# Patient Record
Sex: MALE | Race: WHITE | NOT HISPANIC OR LATINO | Employment: OTHER | URBAN - METROPOLITAN AREA
[De-identification: names, ages, dates, MRNs, and addresses within clinical notes are randomized per-mention and may not be internally consistent; named-entity substitution may affect disease eponyms.]

---

## 2017-01-27 RX ORDER — AZITHROMYCIN 250 MG/1
250 TABLET, FILM COATED ORAL 3 TIMES WEEKLY
Qty: 36 TABLET | Refills: 3 | Status: SHIPPED | OUTPATIENT
Start: 2017-01-27 | End: 2017-04-28

## 2017-04-28 ENCOUNTER — OFFICE VISIT (OUTPATIENT)
Dept: PULMONOLOGY | Facility: CLINIC | Age: 72
End: 2017-04-28

## 2017-04-28 VITALS
SYSTOLIC BLOOD PRESSURE: 134 MMHG | HEIGHT: 70 IN | TEMPERATURE: 98.4 F | BODY MASS INDEX: 33.5 KG/M2 | OXYGEN SATURATION: 96 % | WEIGHT: 234 LBS | HEART RATE: 92 BPM | DIASTOLIC BLOOD PRESSURE: 80 MMHG | RESPIRATION RATE: 16 BRPM

## 2017-04-28 DIAGNOSIS — J45.40 MODERATE PERSISTENT ASTHMA WITHOUT COMPLICATION: ICD-10-CM

## 2017-04-28 DIAGNOSIS — J47.9 BRONCHIECTASIS WITHOUT COMPLICATION (HCC): Primary | ICD-10-CM

## 2017-04-28 PROCEDURE — 94060 EVALUATION OF WHEEZING: CPT | Performed by: INTERNAL MEDICINE

## 2017-04-28 PROCEDURE — 99214 OFFICE O/P EST MOD 30 MIN: CPT | Performed by: INTERNAL MEDICINE

## 2017-04-28 RX ORDER — AZITHROMYCIN 250 MG/1
250 TABLET, FILM COATED ORAL 3 TIMES WEEKLY
COMMUNITY
End: 2017-12-15 | Stop reason: SDUPTHER

## 2017-04-28 NOTE — PROGRESS NOTES
Subjective   Oswaldo Boggs is a 71 y.o.  male previously followed by Dr. Darleen Bates until I assumed his care January 2013. I last saw the patient in the office 10/14/16 and outlined his problems as follows:    1. COPD/asthma and bronchiectasis   A. Previous growth of sensitive pseudomonas (last grew 2/8/16)  B. On Zithromax 250 mg 3 times a week for suppression  2. Paroxysmal atrial fibrillation-in sinus when last seen  A. Refuses anticoagulation  3. 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 Obesity and poor conditioning  5. Hyperlipidemia  6. History nephrolithiasis passed spontaneously  7. Bilateral cataract extraction lens implant, umbilical hernia repair, left inguinal hernia repair  8. Glaucoma     He was doing well but is always has some coarse rhonchi which she did not clear well. I added ipratropium bromide to his albuterol nebulizer and asked him to continue to use a nebulizer 2-3 times a day in addition to his other therapy.    History of Present Illness   Since I last saw the patient he has had no major problems. No ER visits, hospitalizations, severe infections requiring antimicrobial coverage, etc. He is chronically short of breath but remains reasonably active. He gets up at 9 AM, goes to work and pays a few bills, buys a few cars which she sells and is lot etc. He is not however doing any form of regular exercise. He still has a chronic cough and produces some clear to white sputum when he first gets up in the morning and in the evenings. No hemoptysis, pleuritic pain, etc. He does have dyspnea on exertion but is able to perform all his usual activities.    He had a home sleep study about a year ago which indicated no need for CPAP but supposedly his saturations dropped into the 80s. He tells me he has a family members oxygen concentrator that they no longer needed and he uses it at 2 L at night (for some reason he tells me that insurance would not pay for his own concentrator).    The  following portions of the patient's history were reviewed and updated as appropriate: allergies, current medications, past family history, past medical history, past social history, past surgical history and problem list.    Review of Systems   Constitutional: Negative for appetite change, chills, diaphoresis, fatigue, fever and unexpected weight change.        Weight has remained stable   HENT: Negative for congestion, ear discharge, ear pain, hearing loss, postnasal drip, rhinorrhea, sinus pressure, sneezing, sore throat, trouble swallowing and voice change.    Eyes: Negative for visual disturbance.   Respiratory: Positive for cough (chronic as in the history of present illness). Negative for choking, chest tightness, shortness of breath, wheezing and stridor.    Cardiovascular: Positive for leg swelling ( minimal). Negative for chest pain and palpitations.   Gastrointestinal: Negative for abdominal pain, anal bleeding, blood in stool, constipation, diarrhea, nausea and vomiting.   Endocrine: Negative for cold intolerance, heat intolerance, polydipsia and polyuria.   Genitourinary: Negative for decreased urine volume, difficulty urinating, dysuria, frequency, hematuria and urgency.   Musculoskeletal: Negative for arthralgias, back pain, joint swelling and myalgias.   Skin: Negative for pallor and rash.   Allergic/Immunologic: Negative for environmental allergies, food allergies and immunocompromised state.   Neurological: Negative for dizziness, seizures, syncope, speech difficulty, weakness and headaches.   Hematological: Negative for adenopathy. Does not bruise/bleed easily.   Psychiatric/Behavioral: Negative for confusion, decreased concentration and sleep disturbance. The patient is not nervous/anxious.    All other systems reviewed and are negative.      Prior to Admission medications    Medication Sig Start Date End Date Taking? Authorizing Provider   albuterol (PROVENTIL) (2.5 MG/3ML) 0.083% nebulizer  "solution Daily. 11/25/13  Yes Historical Provider, MD   azithromycin (ZITHROMAX) 250 MG tablet Take 250 mg by mouth 3 (Three) Times a Week. Take 2 tablets the first day, then 1 tablet daily for 4 days.   Yes Historical Provider, MD   CARTIA  MG 24 hr capsule 1 tablet 2 (Two) Times a Day. 9/7/16  Yes Historical Provider, MD   colchicine 0.6 MG tablet As Needed. 8/15/16  Yes Historical Provider, MD   fluticasone-salmeterol (ADVAIR DISKUS) 500-50 MCG/DOSE DISKUS 1 puff 2 (Two) Times a Day. 3/31/16  Yes Historical Provider, MD   ibuprofen (ADVIL,MOTRIN) 600 MG tablet As Needed. 7/29/16  Yes Historical Provider, MD   latanoprost (XALATAN) 0.005 % ophthalmic solution Daily As Needed. 9/7/16  Yes Historical Provider, MD   azithromycin (ZITHROMAX) 250 MG tablet Take 1 tablet by mouth 3 (Three) Times a Week. Mon. Wed. Fri. 1/27/17 4/28/17  Vasu Latif MD       Objective   Blood pressure 134/80, pulse 92, temperature 98.4 °F (36.9 °C), resp. rate 16, height 70\" (177.8 cm), weight 234 lb (106 kg), SpO2 96 %.    Flowsheet Rows         First Filed Value    Admission Height  70\" (177.8 cm) Documented at 04/28/2017 1228    Admission Weight  234 lb (106 kg) Documented at 04/28/2017 1228        Physical Exam   Constitutional: He is oriented to person, place, and time. He appears well-developed and well-nourished.   Overweight white male in no acute distress   HENT:   Head: Normocephalic and atraumatic.   Right Ear: Hearing and external ear normal.   Left Ear: Hearing and external ear normal.   Nose: Nose normal.   Mouth/Throat: Oropharynx is clear and moist. No oropharyngeal exudate.   Eyes: Conjunctivae and EOM are normal. Pupils are equal, round, and reactive to light. Right eye exhibits no discharge. Left eye exhibits no discharge. No scleral icterus.   Neck: Normal range of motion. Neck supple. No JVD present. No tracheal deviation present. No thyromegaly present.   Cardiovascular: Normal rate, regular rhythm, normal " heart sounds and intact distal pulses.  Exam reveals no gallop and no friction rub.    No murmur heard.  Pulmonary/Chest: Effort normal. No accessory muscle usage or stridor. No apnea, no tachypnea and no bradypnea. No respiratory distress. He has no wheezes. He has no rhonchi. He has no rales. He exhibits no tenderness.   Continues to have coarse bilateral rhonchi   Abdominal: Soft. Bowel sounds are normal. He exhibits no distension and no mass. There is no splenomegaly or hepatomegaly. There is no tenderness. There is no rebound and no guarding. No hernia.   Obese but no hepatosplenomegaly   Musculoskeletal: Normal range of motion. He exhibits no edema, tenderness or deformity.   Lymphadenopathy:        Head (right side): No submandibular adenopathy present.        Head (left side): No submandibular adenopathy present.     He has no cervical adenopathy.        Right: No supraclavicular and no epitrochlear adenopathy present.        Left: No supraclavicular and no epitrochlear adenopathy present.   Neurological: He is alert and oriented to person, place, and time. He has normal reflexes. He displays normal reflexes. No cranial nerve deficit. He exhibits normal muscle tone. Coordination normal.   Skin: Skin is warm and dry. No bruising, no ecchymosis, no petechiae and no rash noted. He is not diaphoretic. No cyanosis or erythema. No pallor. Nails show no clubbing.   Very dry skin with flaking   Psychiatric: He has a normal mood and affect. His speech is normal and behavior is normal. Judgment and thought content normal.   Nursing note and vitals reviewed.    Spirometry: FVC 2.58 (58%), FEV1 1.47 (45%), FEV1/FVC ratio 57%. This indicates severe obstructive lung disease with an excellent response to bronchodilator (improves FVC and FEV1 22%). FEV1 has increased from 1.37, to 1.47 L since last study 2/8/16    Resting and exercise oxygen saturation: O2 sat is 100% at rest with a heart rate of 101. Patient performed a 6  minute walk and saturations remained at 99% although heart rate rapidly yi to 130    Assessment/Plan     1. COPD/asthma and bronchiectasis   A. Previous growth of sensitive pseudomonas (last grew 2/8/16)  B. On Zithromax 250 mg 3 times a week for suppression  2. Paroxysmal atrial fibrillation-obviously in sinus rhythm today  A. Previously has refused anticoagulation  3. 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 Obesity and poor conditioning  5. Hyperlipidemia  6. History nephrolithiasis passed spontaneously  7. Bilateral cataract extraction lens implant, umbilical hernia repair, left inguinal hernia repair  8. Glaucoma    Discussion:  1. Although he does not drop his saturations with exercise he does at night according to the records are received. I told him to continue nocturnal oxygen  2. Weight loss and exercise 5 days a week (20-30 minutes of brisk walk to maintain a heart rate of 115)  3. Continue Zithromax 3 times a week to suppress Pseudomonas  4. Increased nebulizers to 4 times a day when having difficulty clearing secretions  5. Use flutter valve after neb treatments. If ineffective we could consider VEST but I do not think it necessary at this point  6. Continue yearly flu vaccine  7. I will see you again in 6 months or sooner if necessary      Vasu Latif MD  Pulmonary and Critical Care Medicine  04/28/17 1:45 PM

## 2017-04-28 NOTE — PATIENT INSTRUCTIONS
Pulmonary function studies indicate stability of your obstructive lung disease (chronic asthma and bronchiectasis)    Recommendation:  1. Continue to use oxygen at night  2. I plan on walking you in the office today to make sure you do not need oxygen with exercise  3. Continue yearly flu vaccine  4. As far as I know Clare STOUT has given youboth Pneumovax and Prevnar vaccines  5. Continue Zithromax 3 times a week to suppress Pseudomonas  6. You should be exercising 5 days a week. 20-30 minutes brisk walk to maintain a heart rate of 115  7. If secretions become excessively thick, increase your nebulizer to 4 times a day  8. Use your flutter valve after each nebulizer treatment  9. I will see you again in 6 months or sooner if necessary  10. If your allergies worsen, the options are antihistamines, nasal steroids, or Singulair. These could be started by Gillian

## 2017-12-15 RX ORDER — AZITHROMYCIN 250 MG/1
250 TABLET, FILM COATED ORAL 3 TIMES WEEKLY
Qty: 36 TABLET | Refills: 0 | Status: SHIPPED | OUTPATIENT
Start: 2017-12-15 | End: 2018-01-18 | Stop reason: SDUPTHER

## 2018-01-18 ENCOUNTER — OFFICE VISIT (OUTPATIENT)
Dept: PULMONOLOGY | Facility: CLINIC | Age: 73
End: 2018-01-18

## 2018-01-18 VITALS
DIASTOLIC BLOOD PRESSURE: 72 MMHG | SYSTOLIC BLOOD PRESSURE: 130 MMHG | BODY MASS INDEX: 32.62 KG/M2 | HEIGHT: 71 IN | HEART RATE: 86 BPM | OXYGEN SATURATION: 95 % | TEMPERATURE: 98.2 F | WEIGHT: 233 LBS

## 2018-01-18 DIAGNOSIS — J45.50 SEVERE PERSISTENT ASTHMA WITHOUT COMPLICATION: ICD-10-CM

## 2018-01-18 DIAGNOSIS — J47.9 BRONCHIECTASIS WITHOUT COMPLICATION (HCC): Primary | ICD-10-CM

## 2018-01-18 DIAGNOSIS — R76.11 PPD POSITIVE: ICD-10-CM

## 2018-01-18 PROCEDURE — 99214 OFFICE O/P EST MOD 30 MIN: CPT | Performed by: INTERNAL MEDICINE

## 2018-01-18 RX ORDER — ALBUTEROL SULFATE 90 UG/1
AEROSOL, METERED RESPIRATORY (INHALATION)
COMMUNITY
Start: 2016-02-08 | End: 2018-01-18 | Stop reason: SDUPTHER

## 2018-01-18 RX ORDER — AZITHROMYCIN 250 MG/1
250 TABLET, FILM COATED ORAL 3 TIMES WEEKLY
Qty: 36 TABLET | Refills: 4 | Status: SHIPPED | OUTPATIENT
Start: 2018-01-19 | End: 2018-01-18 | Stop reason: SDUPTHER

## 2018-01-18 RX ORDER — ALBUTEROL SULFATE 90 UG/1
2 AEROSOL, METERED RESPIRATORY (INHALATION) EVERY 6 HOURS PRN
Qty: 1 INHALER | Refills: 5 | Status: SHIPPED | OUTPATIENT
Start: 2018-01-18

## 2018-01-18 RX ORDER — AZITHROMYCIN 250 MG/1
250 TABLET, FILM COATED ORAL 3 TIMES WEEKLY
Qty: 36 TABLET | Refills: 4 | Status: SHIPPED | OUTPATIENT
Start: 2018-01-19 | End: 2018-07-19 | Stop reason: SDUPTHER

## 2018-01-18 RX ORDER — IPRATROPIUM BROMIDE AND ALBUTEROL SULFATE 2.5; .5 MG/3ML; MG/3ML
3 SOLUTION RESPIRATORY (INHALATION) 4 TIMES DAILY PRN
Qty: 120 ML | Refills: 11 | COMMUNITY
Start: 2018-01-18 | End: 2018-07-19 | Stop reason: SDUPTHER

## 2018-01-18 RX ORDER — MONTELUKAST SODIUM 10 MG/1
TABLET ORAL
COMMUNITY
Start: 2017-12-19 | End: 2019-01-31 | Stop reason: SDUPTHER

## 2018-01-18 NOTE — PROGRESS NOTES
Oswaldo Boggs is a 72 y.o. male here for evaluation of   Chief Complaint   Patient presents with   • Recurrent Pneumonia       Patient Active Problem List   Diagnosis   • Bronchiectasis   • Dyslipidemia   • PPD positive   • Severe persistent asthma       Current Outpatient Prescriptions:   •  albuterol (PROAIR HFA) 108 (90 Base) MCG/ACT inhaler, Inhale 2 puffs Every 6 (Six) Hours As Needed for Wheezing., Disp: 1 inhaler, Rfl: 5  •  [START ON 1/19/2018] azithromycin (ZITHROMAX) 250 MG tablet, Take 1 tablet by mouth 3 (Three) Times a Week. Take 2 tablets the first day, then 1 tablet daily for 4 days., Disp: 36 tablet, Rfl: 4  •  CARTIA  MG 24 hr capsule, 1 tablet 2 (Two) Times a Day., Disp: , Rfl:   •  colchicine 0.6 MG tablet, As Needed., Disp: , Rfl:   •  fluticasone-salmeterol (ADVAIR DISKUS) 500-50 MCG/DOSE DISKUS, Inhale 1 puff 2 (Two) Times a Day., Disp: 3 each, Rfl: 4  •  ibuprofen (ADVIL,MOTRIN) 600 MG tablet, As Needed., Disp: , Rfl:   •  latanoprost (XALATAN) 0.005 % ophthalmic solution, Daily As Needed., Disp: , Rfl:   •  ipratropium-albuterol (DUO-NEB) 0.5-2.5 mg/mL nebulizer, Take 3 mL by nebulization 4 (Four) Times a Day As Needed for Wheezing., Disp: 120 mL, Rfl: 11  •  montelukast (SINGULAIR) 10 MG tablet, , Disp: , Rfl:       History of Present Illness  72-year-old gentleman followed by Dr. Latif for bronchiectasis, asthma. He has a known history of pseudomonas aeruginosa colonization. He coughs up approximately a quarter cup of sputum daily. Currently sputum is gray in color without hemoptysis. Approximately 2 months ago he took Levaquin and prednisone for a respiratory infection. He had 1 additional acute infection last year. He has not required emergency room visit or hospitalization. He wears oxygen 2 L at night. He is symptomatically short of air with exertion. He gets winded walking 250 feet. He has no history of coronary disease or chest pain. In 2015 he had an episode of proximal  atrial fibrillation. He denies palpitations or lower extremity edema. He does wheeze on a daily basis. He has a nebulizer but only uses it as needed. He is taking Advair twice daily. He does use an Acapella valve and takes a homeopathic medication to assist with mucous clearance. He denies fever, night sweats, weight loss.      Review of Systems   Constitutional: Positive for activity change. Negative for appetite change, fever and unexpected weight change.   HENT: Negative for congestion, sore throat and trouble swallowing.    Respiratory: Positive for cough, chest tightness, shortness of breath and wheezing.    Cardiovascular: Negative for chest pain, palpitations and leg swelling.   Gastrointestinal: Negative for abdominal distention, abdominal pain, blood in stool, constipation and diarrhea.   Endocrine: Negative.    Genitourinary: Negative for difficulty urinating, dysuria and frequency.   Musculoskeletal: Positive for arthralgias.        Left knee   Skin: Negative.    Neurological: Positive for light-headedness. Negative for dizziness, seizures, weakness, numbness and headaches.   Psychiatric/Behavioral: Negative.          Past Medical History:   Diagnosis Date   • Glaucoma    • History of positive PPD, untreated    • Hypertension    • Nephrolithiasis    • PAF (paroxysmal atrial fibrillation)    • Personal history of renal calculi     x1, passed spontaneously      Past Surgical History:   Procedure Laterality Date   • CATARACT EXTRACTION     • INGUINAL HERNIA REPAIR      x2   • UMBILICAL HERNIA REPAIR       Social History     Social History   • Marital status:      Spouse name: N/A   • Number of children: 3   • Years of education: N/A     Social History Main Topics   • Smoking status: Never Smoker   • Smokeless tobacco: Never Used   • Alcohol use Yes      Comment: 2 or 3 shots of liquor a day   • Drug use: No   • Sexual activity: Not Asked     Other Topics Concern   • None     Social History Narrative  "   Retired  heavy equipment, sold cars     Family History   Problem Relation Age of Onset   • COPD Mother    • Alcohol abuse Father    • Lung disease Sister      Blood pressure 130/72, pulse 86, temperature 98.2 °F (36.8 °C), height 180.3 cm (71\"), weight 106 kg (233 lb), SpO2 95 %.    Physical Exam   Constitutional: He is oriented to person, place, and time. He appears well-developed and well-nourished. No distress.   HENT:   Head: Normocephalic and atraumatic.   Nose: Nose normal.   Mouth/Throat: No oropharyngeal exudate.   Mild clear PND   Eyes: Pupils are equal, round, and reactive to light.   Neck: No JVD present. No tracheal deviation present. No thyromegaly present.   Cardiovascular: Normal rate and regular rhythm.    No murmur heard.  Pulmonary/Chest: Effort normal.   Bilateral mid and end expiratory rhonchi and wheeze R>L   Abdominal: Soft. Bowel sounds are normal. He exhibits no distension.   Musculoskeletal: He exhibits no edema.   Lymphadenopathy:     He has no cervical adenopathy.   Neurological: He is alert and oriented to person, place, and time.   Skin: Skin is warm and dry.   Psychiatric: He has a normal mood and affect.         PFTs: FVC 2.57 L, 58%, FEV1 1.37 L, 42% ratio 53%, total lung capacity 6.72 L, 103%, diffusion capacity 22.9, 91%, moderate to severe obstruction, no restriction or diffusion impairment, no significant change in FEV1 compared to 2017.    Radiology:    Lab:    Oswaldo was seen today for recurrent pneumonia.    Diagnoses and all orders for this visit:    Bronchiectasis without complication    Severe persistent asthma without complication    PPD positive    Other orders  -     Discontinue: fluticasone-salmeterol (ADVAIR DISKUS) 500-50 MCG/DOSE DISKUS; Inhale 1 puff 2 (Two) Times a Day.  -     Discontinue: azithromycin (ZITHROMAX) 250 MG tablet; Take 1 tablet by mouth 3 (Three) Times a Week. Take 2 tablets the first day, then 1 tablet daily for 4 days.  -     albuterol " (PROAIR HFA) 108 (90 Base) MCG/ACT inhaler; Inhale 2 puffs Every 6 (Six) Hours As Needed for Wheezing.  -     fluticasone-salmeterol (ADVAIR DISKUS) 500-50 MCG/DOSE DISKUS; Inhale 1 puff 2 (Two) Times a Day.  -     azithromycin (ZITHROMAX) 250 MG tablet; Take 1 tablet by mouth 3 (Three) Times a Week. Take 2 tablets the first day, then 1 tablet daily for 4 days.        Discussion:   72-year-old gentleman, nonsmoker, with severe asthma and bronchiectasis. He has known Pseudomonas colonization. He is taking Advair 500 one puff twice daily, azithromycin 3 times weekly. He has ipratropium bromide-albuterol nebulizer solution. However, he does not nebulize regularly. He coughs up sputum on a daily basis. He does not have hemoptysis. He received antibiotics and steroids twice last year for an exacerbation. Somewhat worrisome is his significant shortness of air on exertion. He can only walk 250 feet before having to stop and rest.    Look into pulmonary rehabilitation programs that might be closer to him  Refill Advair 500-50 one puff twice daily  Continue azithromycin 250 mg 3 times a week  Encourage him to use his nebulizer at least every morning but preferably twice daily  Flutter or Acapella valve  Follow-up in 6 months

## 2018-07-19 ENCOUNTER — OFFICE VISIT (OUTPATIENT)
Dept: PULMONOLOGY | Facility: CLINIC | Age: 73
End: 2018-07-19

## 2018-07-19 VITALS
BODY MASS INDEX: 34.58 KG/M2 | TEMPERATURE: 97.3 F | HEART RATE: 87 BPM | RESPIRATION RATE: 18 BRPM | HEIGHT: 71 IN | DIASTOLIC BLOOD PRESSURE: 82 MMHG | OXYGEN SATURATION: 96 % | SYSTOLIC BLOOD PRESSURE: 148 MMHG | WEIGHT: 247 LBS

## 2018-07-19 DIAGNOSIS — J47.9 BRONCHIECTASIS WITHOUT COMPLICATION (HCC): Primary | ICD-10-CM

## 2018-07-19 DIAGNOSIS — J45.50 SEVERE PERSISTENT ASTHMA, UNSPECIFIED WHETHER COMPLICATED: ICD-10-CM

## 2018-07-19 PROCEDURE — 99213 OFFICE O/P EST LOW 20 MIN: CPT | Performed by: INTERNAL MEDICINE

## 2018-07-19 PROCEDURE — 94375 RESPIRATORY FLOW VOLUME LOOP: CPT | Performed by: INTERNAL MEDICINE

## 2018-07-19 RX ORDER — AZITHROMYCIN 250 MG/1
250 TABLET, FILM COATED ORAL 3 TIMES WEEKLY
Qty: 36 TABLET | Refills: 4 | Status: SHIPPED | OUTPATIENT
Start: 2018-07-20 | End: 2018-07-23 | Stop reason: SDUPTHER

## 2018-07-19 RX ORDER — IPRATROPIUM BROMIDE AND ALBUTEROL SULFATE 2.5; .5 MG/3ML; MG/3ML
3 SOLUTION RESPIRATORY (INHALATION) 4 TIMES DAILY PRN
Qty: 270 ML | Refills: 4 | Status: SHIPPED | OUTPATIENT
Start: 2018-07-19 | End: 2019-01-31 | Stop reason: SDUPTHER

## 2018-07-19 NOTE — PROGRESS NOTES
Oswaldo Boggs is a 72 y.o. male here for evaluation of   Chief Complaint   Patient presents with   • Bronchiectasis without complication       Problem list:  1. Bronchiectasis  2. Chronic asthma  3. History of pseudomonas aeruginosa colonization  4. Positive PPD, untreated  5. Dyslipidemia  6. Nephrolithiasis  7. Glaucoma  8. Hypertension  9. Paroxysmal atrial fibrillation  10. Cataract extraction  11. Inguinal hernia repair ×2  12. Umbilical hernia repair  13. Nonsmoker, social alcohol  14. Allergy to amoxicillin, rash    History of Present Illness    72-year-old gentleman nonsmoker here for follow-up of chronic persistent asthma and bronchiectasis with Pseudomonas colonization. He typically coughs up a fourth of a cup of sputum daily. Sputum is grayish and right. Recently sputum has been less. He denies hemoptysis. He admits to some wheezing on a daily basis. It tends to be triggered when he is outdoors. He does not awaken at night with wheezing. Exacerbation for which she was given amoxicillin. This caused a rash to develop on his arms. He continues to be symptomatically short of air walking 250 feet. It has not worsened. Denies palpitations or chest pain. He does have some lower extremity edema.    Review of Systems   Constitutional: Negative.    Respiratory: Positive for cough, shortness of breath and wheezing.    Musculoskeletal: Positive for arthralgias and joint swelling.         Current Outpatient Prescriptions:   •  albuterol (PROAIR HFA) 108 (90 Base) MCG/ACT inhaler, Inhale 2 puffs Every 6 (Six) Hours As Needed for Wheezing., Disp: 1 inhaler, Rfl: 5  •  [START ON 7/20/2018] azithromycin (ZITHROMAX) 250 MG tablet, Take 1 tablet by mouth 3 (Three) Times a Week. Take 2 tablets the first day, then 1 tablet daily for 4 days., Disp: 36 tablet, Rfl: 4  •  CARTIA  MG 24 hr capsule, 1 tablet 2 (Two) Times a Day., Disp: , Rfl:   •  colchicine 0.6 MG tablet, As Needed., Disp: , Rfl:   •   "fluticasone-salmeterol (ADVAIR DISKUS) 500-50 MCG/DOSE DISKUS, Inhale 1 puff 2 (Two) Times a Day., Disp: 3 each, Rfl: 4  •  ibuprofen (ADVIL,MOTRIN) 600 MG tablet, As Needed., Disp: , Rfl:   •  ipratropium-albuterol (DUO-NEB) 0.5-2.5 mg/3 ml nebulizer, Take 3 mL by nebulization 4 (Four) Times a Day As Needed for Wheezing., Disp: 270 mL, Rfl: 4  •  latanoprost (XALATAN) 0.005 % ophthalmic solution, Daily As Needed., Disp: , Rfl:   •  montelukast (SINGULAIR) 10 MG tablet, , Disp: , Rfl:     Past Medical History:   Diagnosis Date   • Glaucoma    • History of positive PPD, untreated    • Hypertension    • Nephrolithiasis    • PAF (paroxysmal atrial fibrillation) (CMS/HCC)    • Personal history of renal calculi     x1, passed spontaneously      Past Surgical History:   Procedure Laterality Date   • CATARACT EXTRACTION     • INGUINAL HERNIA REPAIR      x2   • UMBILICAL HERNIA REPAIR       Social History     Social History   • Marital status:    • Number of children: 3     Social History Main Topics   • Smoking status: Never Smoker   • Smokeless tobacco: Never Used   • Alcohol use Yes      Comment: 2 or 3 shots of liquor a day   • Drug use: No   • Sexual activity: Defer     Other Topics Concern   • Not on file     Social History Narrative    Retired  heavy equipment, sold cars     Family History   Problem Relation Age of Onset   • COPD Mother    • Alcohol abuse Father    • Lung disease Sister      Blood pressure 148/82, pulse 87, temperature 97.3 °F (36.3 °C), resp. rate 18, height 180.3 cm (71\"), weight 112 kg (247 lb), SpO2 96 %.    Physical Exam   Constitutional: He is oriented to person, place, and time. He appears well-developed and well-nourished. No distress.   HENT:   Head: Normocephalic and atraumatic.   Nose: Nose normal.   Mouth/Throat: Oropharynx is clear and moist. No oropharyngeal exudate.   Eyes: Pupils are equal, round, and reactive to light. EOM are normal. No scleral icterus.   Neck: Normal " range of motion. Neck supple. No JVD present. No tracheal deviation present. No thyromegaly present.   Cardiovascular: Normal rate, regular rhythm and normal heart sounds.    No murmur heard.  Pulmonary/Chest: Effort normal.   DIFFUSE EXPIRATORY RHONCHI R.L   Abdominal: Soft. Bowel sounds are normal. He exhibits no distension. There is no tenderness.   Musculoskeletal: He exhibits no edema.   Lymphadenopathy:     He has no cervical adenopathy.   Neurological: He is alert and oriented to person, place, and time.   Skin: Skin is warm and dry.   Psychiatric: He has a normal mood and affect.         PFTs:  July 19, 2018 FVC 2.75 L, 63%, FEV1 1.40 L, 44% ratio 51% consistent with severe obstruction.    April 28, 2017 FVC 2.10 L, 48%, FEV1 1.19 L, 37% ratio 57%, after bronchodilator FVC improves to 2.58 L, 58%, FEV1 improves to 1.47 L, 45%    Radiology:  PA and lateral chest x-ray cardiac silhouette upper limits of normal. Minimal scarring in the right middle lobe and right lower lobe. Calcified granulomatous changes. Medial left base with streaking scarring. No acute infiltrate or effusion  Lab:    Oswaldo was seen today for bronchiectasis without complication.    Diagnoses and all orders for this visit:    Bronchiectasis without complication (CMS/[facility])  -     XR Chest PA & Lateral  -     Pulmonary Function Test    Severe persistent asthma, unspecified whether complicated    Other orders  -     fluticasone-salmeterol (ADVAIR DISKUS) 500-50 MCG/DOSE DISKUS; Inhale 1 puff 2 (Two) Times a Day.  -     azithromycin (ZITHROMAX) 250 MG tablet; Take 1 tablet by mouth 3 (Three) Times a Week. Take 2 tablets the first day, then 1 tablet daily for 4 days.  -     ipratropium-albuterol (DUO-NEB) 0.5-2.5 mg/3 ml nebulizer; Take 3 mL by nebulization 4 (Four) Times a Day As Needed for Wheezing.        Discussion:   72-year-old gentleman, nonsmoker with bronchiectasis, chronic persistent asthma. He is currently using an Advair disc 500-50  one puff twice daily and an albuterol nebulizer. He takes azithromycin 250 mg 3 times a week. He does get symptomatically short of air with exertion walking 250 feet. His exercise tolerance has not worsened. He has had one acute respiratory infection in the last 6 months. He does have significant congestion on examination but no active wheezing. Room air oxygen saturation is 96%. FEV1 is 1.40 L, 44% which is improved compared to April 2017 when his FEV1 was 3.20 L or 37%. Chest x-ray today has some chronic changes.    Refill Advair 500-50, 1 puff twice daily  Refill azithromycin 250 mg 3 times weekly  Albuterol nebulizer twice daily and up to 4 times daily as needed  Avoid anticholinergics secondary to glaucoma  Flu shot in October of this year  Follow-up in 6 months with spirometry    Ela Myrick MD

## 2018-07-23 RX ORDER — AZITHROMYCIN 250 MG/1
250 TABLET, FILM COATED ORAL 3 TIMES WEEKLY
Qty: 36 TABLET | Refills: 4 | Status: SHIPPED | OUTPATIENT
Start: 2018-07-23 | End: 2019-01-31 | Stop reason: SDUPTHER

## 2019-01-31 ENCOUNTER — OFFICE VISIT (OUTPATIENT)
Dept: PULMONOLOGY | Facility: CLINIC | Age: 74
End: 2019-01-31

## 2019-01-31 VITALS
OXYGEN SATURATION: 96 % | BODY MASS INDEX: 35.79 KG/M2 | WEIGHT: 250 LBS | TEMPERATURE: 97.5 F | HEIGHT: 70 IN | HEART RATE: 93 BPM | SYSTOLIC BLOOD PRESSURE: 126 MMHG | DIASTOLIC BLOOD PRESSURE: 70 MMHG | RESPIRATION RATE: 20 BRPM

## 2019-01-31 DIAGNOSIS — J45.909 ASTHMA, UNSPECIFIED ASTHMA SEVERITY, UNSPECIFIED WHETHER COMPLICATED, UNSPECIFIED WHETHER PERSISTENT: Primary | ICD-10-CM

## 2019-01-31 DIAGNOSIS — J47.9 BRONCHIECTASIS WITHOUT COMPLICATION (HCC): Primary | ICD-10-CM

## 2019-01-31 PROCEDURE — 99213 OFFICE O/P EST LOW 20 MIN: CPT | Performed by: INTERNAL MEDICINE

## 2019-01-31 PROCEDURE — 94375 RESPIRATORY FLOW VOLUME LOOP: CPT | Performed by: INTERNAL MEDICINE

## 2019-01-31 RX ORDER — MONTELUKAST SODIUM 10 MG/1
10 TABLET ORAL NIGHTLY
Qty: 90 TABLET | Refills: 4 | Status: SHIPPED | OUTPATIENT
Start: 2019-01-31

## 2019-01-31 RX ORDER — AZITHROMYCIN 250 MG/1
250 TABLET, FILM COATED ORAL 3 TIMES WEEKLY
Qty: 36 TABLET | Refills: 4 | Status: SHIPPED | OUTPATIENT
Start: 2019-02-01

## 2019-01-31 RX ORDER — IPRATROPIUM BROMIDE AND ALBUTEROL SULFATE 2.5; .5 MG/3ML; MG/3ML
3 SOLUTION RESPIRATORY (INHALATION) 4 TIMES DAILY PRN
Qty: 270 ML | Refills: 4 | Status: SHIPPED | OUTPATIENT
Start: 2019-01-31

## 2019-01-31 NOTE — PROGRESS NOTES
Oswaldo Boggs is a 73 y.o. male here for evaluation of   Chief Complaint   Patient presents with   • Recurrent Pneumonia       Problem list:  1. Bronchiectasis  2. Chronic asthma  3. History of pseudomonas aeruginosa colonization  4. Positive PPD, untreated  5. Dyslipidemia  6. Nephrolithiasis  7. Glaucoma  8. Hypertension  9. Paroxysmal atrial fibrillation  10. Cataract extraction  11. Inguinal hernia repair ×2  12. Umbilical hernia repair  13. Nonsmoker, social alcohol  14. Allergy to amoxicillin, rash    History of Present Illness    72-year-old gentleman, nonsmoker with a history of bronchiectasis, Pseudomonas colonization. He coughs up chronic sputum on a daily basis. It is grayish in color. He denies hemoptysis. On average he will cough up a quarter cup a day. Presently 4-5 weeks ago he did take Levaquin for 10 days secondary to increasing sputum production. Currently he denies wheezing. He does have exertional dyspnea. He feels it is at baseline. Is not required hospitalization for respiratory infection. He did hurt his left shoulder and is currently undergoing physical therapy. He does become symptomatically short of air walking 250 feet. He wears supplemental oxygen at night.    Review of Systems   Constitutional: Negative for fever.   HENT: Positive for congestion and rhinorrhea.    Eyes:        Glaucoma; recent exam pressures ok 14   Respiratory: Positive for cough, chest tightness, shortness of breath and wheezing.    Cardiovascular: Negative for chest pain and leg swelling.   Musculoskeletal:        Left Shoulder pain, getting PT   All other systems reviewed and are negative.        Current Outpatient Medications:   •  albuterol (PROAIR HFA) 108 (90 Base) MCG/ACT inhaler, Inhale 2 puffs Every 6 (Six) Hours As Needed for Wheezing., Disp: 1 inhaler, Rfl: 5  •  azithromycin (ZITHROMAX) 250 MG tablet, Take 1 tablet by mouth 3 (Three) Times a Week. Mon, Wed, Fri, Disp: 36 tablet, Rfl: 4  •  colchicine 0.6  "MG tablet, As Needed., Disp: , Rfl:   •  fluticasone-salmeterol (ADVAIR DISKUS) 500-50 MCG/DOSE DISKUS, Inhale 1 puff 2 (Two) Times a Day., Disp: 3 each, Rfl: 4  •  ibuprofen (ADVIL,MOTRIN) 600 MG tablet, As Needed., Disp: , Rfl:   •  ipratropium-albuterol (DUO-NEB) 0.5-2.5 mg/3 ml nebulizer, Take 3 mL by nebulization 4 (Four) Times a Day As Needed for Wheezing., Disp: 270 mL, Rfl: 4  •  latanoprost (XALATAN) 0.005 % ophthalmic solution, Daily As Needed., Disp: , Rfl:   •  montelukast (SINGULAIR) 10 MG tablet, , Disp: , Rfl:     Past Medical History:   Diagnosis Date   • Glaucoma    • History of positive PPD, untreated    • Hypertension    • Nephrolithiasis    • PAF (paroxysmal atrial fibrillation) (CMS/HCC)    • Personal history of renal calculi     x1, passed spontaneously      Past Surgical History:   Procedure Laterality Date   • CATARACT EXTRACTION     • INGUINAL HERNIA REPAIR      x2   • UMBILICAL HERNIA REPAIR       Social History     Socioeconomic History   • Marital status:      Spouse name: Not on file   • Number of children: 3   • Years of education: Not on file   • Highest education level: Not on file   Tobacco Use   • Smoking status: Never Smoker   • Smokeless tobacco: Never Used   Substance and Sexual Activity   • Alcohol use: Yes     Comment: 2 or 3 shots of liquor a day   • Drug use: No   • Sexual activity: Defer   Social History Narrative    Retired  heavy equipment, sold cars     Family History   Problem Relation Age of Onset   • COPD Mother    • Alcohol abuse Father    • Lung disease Sister      Blood pressure 126/70, pulse 93, temperature 97.5 °F (36.4 °C), temperature source Temporal, resp. rate 20, height 177.8 cm (70\"), weight 113 kg (250 lb), SpO2 96 %.    Physical Exam   Constitutional: He is oriented to person, place, and time. He appears well-developed and well-nourished. No distress.   HENT:   Head: Normocephalic and atraumatic.   Nasal erythema, no polyps   Eyes: Pupils " are equal, round, and reactive to light. No scleral icterus.   Neck: No JVD present. No tracheal deviation present. No thyromegaly present.   Cardiovascular: Normal rate, regular rhythm and normal heart sounds.   No murmur heard.  Pulmonary/Chest: Effort normal and breath sounds normal. No respiratory distress.   Rare expiratory rhonchi   Abdominal: Soft. Bowel sounds are normal. There is no tenderness.   Musculoskeletal: He exhibits no edema.   Lymphadenopathy:     He has no cervical adenopathy.   Neurological: He is alert and oriented to person, place, and time.   Skin: Skin is warm and dry.   Psychiatric: He has a normal mood and affect.         PFTs:  FVC 2.81 L, 65%, FEV1 1.50 L, 48% ratio 54%, moderate to severe obstruction, possible restriction    Radiology:    Lab:    Oswaldo was seen today for recurrent pneumonia.    Diagnoses and all orders for this visit:    Bronchiectasis without complication (CMS/[facility])        Discussion:   73-year-old gentleman, nonsmoker with bronchiectasis, chronic sputum production. He did require one round of antibiotics recently for increasing sputum production. Today he has no active wheezing on examination. He has moderate to severe obstruction on pulmonary function tests with an FEV1 of 1.50 L or 48%. He still has symptomatic dyspnea on exertion. He can only walk around 250 feet before he is winded.    Referred to [facility] for pulmonary rehabilitation  Continue duo neb twice daily and as needed up to 4 times daily  Refill Advair 500-50 one puff twice daily, Singulair 10 mg daily, azithromycin 250 mg 3 times a week  Mucolytic's  Follow-up in 6 months with chest x-ray    Ela Myrick MD

## 2019-02-01 DIAGNOSIS — J47.9 BRONCHIECTASIS WITHOUT COMPLICATION (HCC): Primary | ICD-10-CM

## 2019-02-07 ENCOUNTER — TRANSCRIBE ORDERS (OUTPATIENT)
Dept: PULMONOLOGY | Facility: CLINIC | Age: 74
End: 2019-02-07

## 2019-11-10 ENCOUNTER — HOSPITAL ENCOUNTER (INPATIENT)
Facility: HOSPITAL | Age: 74
LOS: 2 days | Discharge: HOME | End: 2019-11-12
Attending: INTERNAL MEDICINE | Admitting: INTERNAL MEDICINE

## 2019-11-10 ENCOUNTER — APPOINTMENT (OUTPATIENT)
Dept: MRI IMAGING | Facility: HOSPITAL | Age: 74
End: 2019-11-10

## 2019-11-10 ENCOUNTER — APPOINTMENT (OUTPATIENT)
Dept: CT IMAGING | Facility: HOSPITAL | Age: 74
End: 2019-11-10

## 2019-11-10 DIAGNOSIS — R41.841 COGNITIVE COMMUNICATION DEFICIT: ICD-10-CM

## 2019-11-10 DIAGNOSIS — I63.40 CEREBROVASCULAR ACCIDENT (CVA) DUE TO EMBOLISM OF CEREBRAL ARTERY (HCC): Primary | ICD-10-CM

## 2019-11-10 PROBLEM — F10.10 ALCOHOL ABUSE: Status: ACTIVE | Noted: 2019-11-10

## 2019-11-10 PROBLEM — I63.9 CVA (CEREBRAL VASCULAR ACCIDENT): Status: ACTIVE | Noted: 2019-11-10

## 2019-11-10 PROBLEM — I48.0 PAF (PAROXYSMAL ATRIAL FIBRILLATION): Status: ACTIVE | Noted: 2019-11-10

## 2019-11-10 PROBLEM — I10 HYPERTENSION: Status: ACTIVE | Noted: 2019-11-10

## 2019-11-10 LAB — GLUCOSE BLDC GLUCOMTR-MCNC: 132 MG/DL (ref 70–130)

## 2019-11-10 PROCEDURE — 0 IOPAMIDOL PER 1 ML: Performed by: INTERNAL MEDICINE

## 2019-11-10 PROCEDURE — 70551 MRI BRAIN STEM W/O DYE: CPT

## 2019-11-10 PROCEDURE — 70498 CT ANGIOGRAPHY NECK: CPT

## 2019-11-10 PROCEDURE — 94799 UNLISTED PULMONARY SVC/PX: CPT

## 2019-11-10 PROCEDURE — 99222 1ST HOSP IP/OBS MODERATE 55: CPT | Performed by: INTERNAL MEDICINE

## 2019-11-10 PROCEDURE — 70496 CT ANGIOGRAPHY HEAD: CPT

## 2019-11-10 PROCEDURE — 82962 GLUCOSE BLOOD TEST: CPT

## 2019-11-10 PROCEDURE — 25010000002 THIAMINE PER 100 MG: Performed by: NURSE PRACTITIONER

## 2019-11-10 PROCEDURE — 0042T HC CT CEREBRAL PERFUSION W/WO CONTRAST: CPT

## 2019-11-10 RX ORDER — LORAZEPAM 1 MG/1
2 TABLET ORAL
Status: DISCONTINUED | OUTPATIENT
Start: 2019-11-10 | End: 2019-11-11

## 2019-11-10 RX ORDER — LORAZEPAM 2 MG/ML
1 INJECTION INTRAMUSCULAR
Status: DISCONTINUED | OUTPATIENT
Start: 2019-11-10 | End: 2019-11-11

## 2019-11-10 RX ORDER — FOLIC ACID 1 MG/1
1 TABLET ORAL DAILY
Status: DISCONTINUED | OUTPATIENT
Start: 2019-11-11 | End: 2019-11-11

## 2019-11-10 RX ORDER — COLCHICINE 0.6 MG/1
0.6 TABLET ORAL EVERY 12 HOURS PRN
Status: DISCONTINUED | OUTPATIENT
Start: 2019-11-10 | End: 2019-11-11

## 2019-11-10 RX ORDER — AZITHROMYCIN 250 MG/1
250 TABLET, FILM COATED ORAL 3 TIMES WEEKLY
Status: DISCONTINUED | OUTPATIENT
Start: 2019-11-11 | End: 2019-11-12 | Stop reason: HOSPADM

## 2019-11-10 RX ORDER — LORAZEPAM 0.5 MG/1
0.5 TABLET ORAL
Status: DISCONTINUED | OUTPATIENT
Start: 2019-11-10 | End: 2019-11-11

## 2019-11-10 RX ORDER — LATANOPROST 50 UG/ML
1 SOLUTION/ DROPS OPHTHALMIC NIGHTLY
Status: DISCONTINUED | OUTPATIENT
Start: 2019-11-10 | End: 2019-11-12 | Stop reason: HOSPADM

## 2019-11-10 RX ORDER — LORAZEPAM 1 MG/1
1 TABLET ORAL
Status: DISCONTINUED | OUTPATIENT
Start: 2019-11-10 | End: 2019-11-11

## 2019-11-10 RX ORDER — LORAZEPAM 2 MG/ML
2 INJECTION INTRAMUSCULAR
Status: DISCONTINUED | OUTPATIENT
Start: 2019-11-10 | End: 2019-11-11

## 2019-11-10 RX ORDER — ASPIRIN 300 MG/1
300 SUPPOSITORY RECTAL DAILY
Status: DISCONTINUED | OUTPATIENT
Start: 2019-11-11 | End: 2019-11-12

## 2019-11-10 RX ORDER — THIAMINE MONONITRATE (VIT B1) 100 MG
100 TABLET ORAL DAILY
Status: DISCONTINUED | OUTPATIENT
Start: 2019-11-11 | End: 2019-11-11

## 2019-11-10 RX ORDER — BUDESONIDE AND FORMOTEROL FUMARATE DIHYDRATE 160; 4.5 UG/1; UG/1
2 AEROSOL RESPIRATORY (INHALATION)
Status: DISCONTINUED | OUTPATIENT
Start: 2019-11-10 | End: 2019-11-12 | Stop reason: HOSPADM

## 2019-11-10 RX ORDER — ACETAMINOPHEN 325 MG/1
650 TABLET ORAL EVERY 6 HOURS PRN
Status: DISCONTINUED | OUTPATIENT
Start: 2019-11-10 | End: 2019-11-12

## 2019-11-10 RX ORDER — SODIUM CHLORIDE 9 MG/ML
75 INJECTION, SOLUTION INTRAVENOUS CONTINUOUS
Status: DISCONTINUED | OUTPATIENT
Start: 2019-11-10 | End: 2019-11-11

## 2019-11-10 RX ORDER — SODIUM CHLORIDE 0.9 % (FLUSH) 0.9 %
10 SYRINGE (ML) INJECTION AS NEEDED
Status: DISCONTINUED | OUTPATIENT
Start: 2019-11-10 | End: 2019-11-12 | Stop reason: HOSPADM

## 2019-11-10 RX ORDER — LORAZEPAM 2 MG/ML
0.5 INJECTION INTRAMUSCULAR
Status: DISCONTINUED | OUTPATIENT
Start: 2019-11-10 | End: 2019-11-11

## 2019-11-10 RX ORDER — FAMOTIDINE 10 MG/ML
20 INJECTION, SOLUTION INTRAVENOUS EVERY 12 HOURS SCHEDULED
Status: DISCONTINUED | OUTPATIENT
Start: 2019-11-10 | End: 2019-11-11

## 2019-11-10 RX ORDER — THIAMINE MONONITRATE (VIT B1) 100 MG
100 TABLET ORAL ONCE
Status: COMPLETED | OUTPATIENT
Start: 2019-11-10 | End: 2019-11-10

## 2019-11-10 RX ORDER — SODIUM CHLORIDE 0.9 % (FLUSH) 0.9 %
10 SYRINGE (ML) INJECTION EVERY 12 HOURS SCHEDULED
Status: DISCONTINUED | OUTPATIENT
Start: 2019-11-10 | End: 2019-11-12 | Stop reason: HOSPADM

## 2019-11-10 RX ORDER — ASPIRIN 325 MG
325 TABLET ORAL DAILY
Status: DISCONTINUED | OUTPATIENT
Start: 2019-11-11 | End: 2019-11-12 | Stop reason: HOSPADM

## 2019-11-10 RX ORDER — DIPHENOXYLATE HYDROCHLORIDE AND ATROPINE SULFATE 2.5; .025 MG/1; MG/1
1 TABLET ORAL DAILY
Status: DISCONTINUED | OUTPATIENT
Start: 2019-11-11 | End: 2019-11-11

## 2019-11-10 RX ORDER — ATORVASTATIN CALCIUM 40 MG/1
80 TABLET, FILM COATED ORAL NIGHTLY
Status: DISCONTINUED | OUTPATIENT
Start: 2019-11-10 | End: 2019-11-12 | Stop reason: HOSPADM

## 2019-11-10 RX ORDER — IPRATROPIUM BROMIDE AND ALBUTEROL SULFATE 2.5; .5 MG/3ML; MG/3ML
3 SOLUTION RESPIRATORY (INHALATION)
Status: DISCONTINUED | OUTPATIENT
Start: 2019-11-10 | End: 2019-11-12 | Stop reason: HOSPADM

## 2019-11-10 RX ADMIN — ACETAMINOPHEN 650 MG: 325 TABLET ORAL at 22:39

## 2019-11-10 RX ADMIN — LATANOPROST 1 DROP: 50 SOLUTION OPHTHALMIC at 20:19

## 2019-11-10 RX ADMIN — BUDESONIDE AND FORMOTEROL FUMARATE DIHYDRATE 2 PUFF: 160; 4.5 AEROSOL RESPIRATORY (INHALATION) at 20:53

## 2019-11-10 RX ADMIN — IPRATROPIUM BROMIDE AND ALBUTEROL SULFATE 3 ML: 2.5; .5 SOLUTION RESPIRATORY (INHALATION) at 20:53

## 2019-11-10 RX ADMIN — SODIUM CHLORIDE 75 ML/HR: 9 INJECTION, SOLUTION INTRAVENOUS at 18:52

## 2019-11-10 RX ADMIN — THIAMINE HYDROCHLORIDE 100 MG: 100 INJECTION, SOLUTION INTRAMUSCULAR; INTRAVENOUS at 20:56

## 2019-11-10 RX ADMIN — SODIUM CHLORIDE, PRESERVATIVE FREE 10 ML: 5 INJECTION INTRAVENOUS at 20:12

## 2019-11-10 RX ADMIN — ATORVASTATIN CALCIUM 80 MG: 40 TABLET, FILM COATED ORAL at 20:23

## 2019-11-10 RX ADMIN — FAMOTIDINE 20 MG: 10 INJECTION, SOLUTION INTRAVENOUS at 20:12

## 2019-11-10 RX ADMIN — IOPAMIDOL 100 ML: 755 INJECTION, SOLUTION INTRAVENOUS at 19:26

## 2019-11-10 NOTE — PLAN OF CARE
Problem: Patient Care Overview  Goal: Plan of Care Review  Outcome: Ongoing (interventions implemented as appropriate)   11/10/19 6416   Coping/Psychosocial   Plan of Care Reviewed With patient   Plan of Care Review   Progress no change       Problem: Stroke (Ischemic) (Adult)  Goal: Signs and Symptoms of Listed Potential Problems Will be Absent, Minimized or Managed (Stroke)  Outcome: Ongoing (interventions implemented as appropriate)   11/10/19 1136   Goal/Outcome Evaluation   Problems Assessed (Stroke (Ischemic)) all   Problems Assessed (Stroke (Ischemic)) other (see comments)  (DYSARTHRIA)

## 2019-11-10 NOTE — NURSING NOTE
ACC REVIEW REPORT: [facility]        PATIENT NAME: Oswaldo Boggs    PATIENT ID: [id number]    BED:  N235    BED TYPE:  ICU    BED GIVEN TO:  MAY AMIN RN    TIME BED GIVEN:  17:30    YOB: 1945    AGE:      GENDER:  MALE    PREVIOUS ADMIT TO [facility]:  NO    PREVIOUS ADMISSION DATE:  NONE    PATIENT CLASS:  INPATIENT    TODAY'S DATE: 11/10/2019    TRANSFER DATE:   11/10/19    ETA:  18:02    TRANSFERRING FACILITY:   [facility] ED    TRANSFERRING FACILITY PHONE # :  [phone] OPTION 4    TRANSFERRING MD:  DR ANGELICA RANGEL    DATE/TIME REQUEST RECEIVED:  17:33 11/10/19    [facility] RN:  CARO RAMIREZ RN     REPORT FROM:  17:30    TIME REPORT TAKEN:  17:30    DIAGNOSIS:  CVA    REASON FOR TRANSFER TO [facility]:  CVA    TRANSPORTATION:  AIR METHODS    CLINICAL REASON FOR TRANSFER TO [facility]:  CVA, LAST KNOWN NORMAL AT 15:00 TODAY. PT WENT TO BATHROOM WAS DIZZY WITH SLURRED SPEECH.      CLINICAL INFORMATION    HEIGHT:      WEIGHT:  ESTIMATED  KG    ALLERGIES:   NKDA    FRANKLIN:      INFECTIOUS DISEASE:      ISOLATION:      1ST VITAL SIGNS:   TIME:  16:57  TEMP:  97.8  PULSE:  75  B/P:  176/87  RESP:  20    LAST VITAL SIGNS:  TIME:  17:43  TEMP:     PULSE:    B/P:  190/92  RESP:      LAB INFORMATION:      CULTURE INFORMATION:      MEDS/IV FLUIDS:  IV ACCESS # 20 GA IN THE RIGHT AND LEFT A/C       TPA GIVEN BOLUS OF 9  MG PER PROTOCOL FOLLOWED BY 90 MG PER PROTOCOL WITH 100 ML FLUSH BEHIND TPA      CARDIAC SYSTEM:    CHEST PAIN:      RATE:      SCALE:      RHYTHM:   SINUS TACHYCARDIA    Is patient taking or has patient been given any drugs that could increase bleeding?    (Plavix, Brilinta, Effient, Eliquis, Xarelto, Warfarin, Integrilin, Angiomax)    DRUG:       DOSE/FREQUENCY:      CARDIAC NOTES:  HX HTN    RESPIRATORY SYSTEM:    LUNG SOUNDS:    CLEAR:  YES      OXYGEN:  NONE    O2 SAT:  98%    ADMINISTRATION ROUTE:  ROOM AIR    RADIOLOGY RESULTS:      RESPIRATORY STATUS:         CNS/MUSCULOSKELETAL      JUANITO COMA SCALE:    E:  4  M:  6  V:  5    LAST KNOWN WELL:   15:00 TODAY    NIHSS  1    Survey Item  0: Means Alert  1: Drowsy or Answer Correctly  2: Incorrect, Forced, Can't Resist Gravity  3: Complete or No Effort  4: No Movement  NT: Not Testable Acceptable As Noted Above    1A: Level of Consciousness (alert, drowsy, etc) :      1B: LOC Questions (month, age) :      1C: LOC Commands (open/close eyes, make a fist & let go):      2:  Best Gaze (eyes open-pt follows examiner's fingers or face):      3:  Visual (introduce visual stimulus/threat to pt's visual field quad. Cover 1 eye and hold up fingers in all 4 quadrants) :      4.  Facial Palsy (show teeth, raise eyebrows and squeeze eyes tightly shut):      5A: Motor Arm-Left (elevate extremity to 90 degrees and score drift/movement.  Count to 10 aloud and use fingers for visual cue):      5B:  Motor Arm-Right (elevate extremity to 90 degrees and score drift/movement.  Count to 10 aloud and use fingers for visual cue):      6A:  Motor Leg-Left (elevate extremity to 30 degrees and score drift/movement.  Count to 5 out loud and use fingers for visual cue):      6B:  Motor Leg-Right (elevate extremity to 30 degrees and score drift/movement.  Count to 5 out loud and use fingers for visual cue):       7:  Limb Ataxia- finger to nose, heel down shin:     8:  Sensory- pin prick to face, arms, trunk, and legs. Compare sharpness side to side:       9:  Best Language- name, items, describe picture, and read sentences.  Do not forget glasses if they normally wear them:      10: Dysarthria- elevate speech clarity by pt reading or repeating words on a list:  1    11: Extinction and Inattention- Use information from prior testing or double simultaneous stimuli testing to identify neglect. Face, arms, legs and visual field:      Total NIHSS Score:  1  Date:  11/10/20-19  Time of NIHSS Assessment:      SIZE OF HEMORRHAGE:  NONE    CAT SCAN  RESULTS:  NO ICH        CNS/MUSCULOSKELETAL NOTES:  PT HAD HIS LAST DRINK OF ALCOHOL PRODUCTS 3 DAYS AGO.  HE STATED TO THE TRANSFERRING NURSE HE HAD 2 DRINKS / DAY.      GI//GY    GI//GY NOTES:  PT IS NPO.    PAST MEDICAL HISTORY:  HTN and GOUT    OTHER SYMPTOM NOTES:      ADDITIONAL NOTES:            Luda Hong RN  11/10/2019  5:47 PM

## 2019-11-10 NOTE — H&P
"  CRITICAL CARE ADMISSION NOTE    Chief Complaint     CVA (cerebral vascular accident) (CMS/[facility])      Problem List       CVA (cerebral vascular accident) (CMS/HCC) post tPA    Dyslipidemia    PPD positive    PAF (paroxysmal atrial fibrillation) (CMS/[facility])    Hypertension    Alcohol abuse        History of Present Illness     Oswaldo Boggs is a 74 y.o. male, non-smoker, known to our pulmonary service and presents to [facility] via air ambulance after presenting to [facility] with speech difficulty.  Last known normal was 1500 this afternoon after which time the patient was using the restroom when he became dizzy and had slurred speech, and stated \"I feel like I am drunk but I have not drank any alcohol\".  He discusses that while in the restroom he had a \"clogged ear\" and put hydrogen peroxide in after which he became dizzy and had to sit down.  He denies a fall.  Patient does admit to drinking \"a lot\" and when pressed states that it is likely more than 1/5/day which he has tried to decrease and quit drinking altogether about 4 days ago.  Prior to this episode he denies a recent history of fever or chills, cough/congestion, nausea/vomiting/constipation/diarrhea/abdominal pain, lower extremity edema, palpitations, lymphadenopathy, or headaches.  He denies a history of \"blacking out\" or seizures with his alcohol use. He denies use of tobacco products but has a history of bronchiectasis and positive PPD.  He uses 2 L of oxygen at night.  His initial NIH score was 2, upon arrival to [facility] his NIH score is 1.      Past Medical History, Surgical History, Family, Social History, and ROS       He  has a past medical history of Glaucoma, History of positive PPD, untreated, Hypertension, Nephrolithiasis, PAF (paroxysmal atrial fibrillation) (CMS/[facility]), and Personal history of renal calculi.    Past Surgical History:   Procedure Laterality Date   • CATARACT EXTRACTION     • " COLONOSCOPY     • INGUINAL HERNIA REPAIR      x2   • UMBILICAL HERNIA REPAIR         Allergies   Allergen Reactions   • Amoxil [Amoxicillin] Rash     No current facility-administered medications on file prior to encounter.      Current Outpatient Medications on File Prior to Encounter   Medication Sig   • albuterol (PROAIR HFA) 108 (90 Base) MCG/ACT inhaler Inhale 2 puffs Every 6 (Six) Hours As Needed for Wheezing.   • azithromycin (ZITHROMAX) 250 MG tablet Take 1 tablet by mouth 3 (Three) Times a Week. Mon, Wed, Fri   • colchicine 0.6 MG tablet As Needed.   • fluticasone-salmeterol (ADVAIR DISKUS) 500-50 MCG/DOSE DISKUS Inhale 1 puff 2 (Two) Times a Day.   • ibuprofen (ADVIL,MOTRIN) 600 MG tablet As Needed.   • ipratropium-albuterol (DUO-NEB) 0.5-2.5 mg/3 ml nebulizer Take 3 mL by nebulization 4 (Four) Times a Day As Needed for Wheezing.   • latanoprost (XALATAN) 0.005 % ophthalmic solution Daily As Needed.   • montelukast (SINGULAIR) 10 MG tablet Take 1 tablet by mouth Every Night.     MEDICATION LIST AND ALLERGIES REVIEWED.    Family History   Problem Relation Age of Onset   • COPD Mother    • Alcohol abuse Father    • Lung disease Sister      Social History     Tobacco Use   • Smoking status: Never Smoker   • Smokeless tobacco: Never Used   Substance Use Topics   • Alcohol use: Yes     Comment: 2 or 3 shots of liquor a day   • Drug use: No     Social History     Social History Narrative    Retired  heavy equipment, sold cars     FAMILY AND SOCIAL HISTORY REVIEWED.    Review of Systems   Constitutional: Negative for chills and fever.   HENT: Positive for hearing loss. Negative for congestion and sneezing.    Eyes: Negative.    Respiratory: Positive for cough. Negative for choking, shortness of breath and wheezing.    Cardiovascular: Negative for chest pain and palpitations.   Gastrointestinal: Negative for abdominal pain, blood in stool, constipation, diarrhea, nausea and vomiting.   Endocrine: Negative.   Negative for polyphagia and polyuria.   Genitourinary: Negative.  Negative for difficulty urinating.   Musculoskeletal: Positive for gait problem.   Skin: Negative.    Allergic/Immunologic: Negative.    Neurological: Positive for dizziness, speech difficulty and light-headedness.   Hematological: Negative.    Psychiatric/Behavioral: Negative.      A 14 point review of systems was obtained as noted in ROS, ALL OTHER SYSTEMS REVIEWED AND ARE NEGATIVE.    Physical Exam and Clinical Information   /79   Pulse 78   Temp 98 °F (36.7 °C) (Oral)   Resp 16   SpO2 97%     Physical Exam:    Telemetry: Rhythm: normal sinus rhythm (11/10/19 1830)              Constitutional:  No acute distress.  Conversant.   HEENT: Normocephalic and atraumatic.  Clear PND. MERRITT, EOMI   Neck:  Normal range of motion. Neck supple.   No JVD present.   No thyromegaly.    Cardiovascular: Regular rate and rhythm.  No murmurs, rub or gallop.  No peripheral edema.   Respiratory: Normal respiratory effort.  Scattered expiratory rhonchi anteriorly  Clear to percussion.    Abdominal:  Soft. No masses.   Non-tender. No distension.   No hepatosplenomegaly.   MSK: Normal muscle strength and tone.   Extremities: No digital cyanosis or clubbing.   Skin: Skin is warm and dry.  No rashes, lesions or ulcers noted.    Neurological:   Alert and Oriented to person, place, and time.   Speech dysarthria  No motor drift upper or lower ext.  Face symmetric, tongue midline   Psychiatric:  Normal affect.      Lines/Drains/Airways: Peripheral IV(s)               Invalid input(s): CHLORIDE  CrCl cannot be calculated (No order found.).          No results found for: LACTATE     IMAGES:    No radiology results for the last day      I reviewed the patient's results and images.       Advance Directives: Code Status and Medical Interventions:   Ordered at: 11/10/19 1833     Level Of Support Discussed With:    Patient     Code Status:    CPR     Medical Interventions  (Level of Support Prior to Arrest):    Full            Assesment     Active Hospital Problems    Diagnosis   • **CVA (cerebral vascular accident) (CMS/[facility]) post tPA   • PAF (paroxysmal atrial fibrillation) (CMS/[facility])     Followed by Dr. Hodge. Started on Diltiazem.     • Hypertension   • Alcohol abuse     States he drank at least 1/5 if not more daily, but quit 4 days prior to admission on 11/10/2019.     • Dyslipidemia     On no therapy     • PPD positive     A.  Age 12 he was exposed to his stepfather who had active tuberculosis and he was      hospitalized for 6 months in a [facility] and [place], [place].  He did have a positive      PPD but did not require any therapy and never developed active TB.               Plan/Recommendations       74 y.o.male, admitted on 11/10/2019 with CVA (cerebral vascular accident) (CMS/[facility]) [I63.9]:   Patient presenting with dysarthria and dizziness.  NIH stroke scale is a 1-2.  TPA initiated at the outside hospital.  Currently blood pressure 160/95 and in sinus rhythm.  History of daily alcohol consumption.  However, no alcohol for 3 days.  No signs or symptoms of alcohol withdrawal.  1. Admit to ICU  2. Initiate CVA post TPA order set, as well as alcohol withdrawal protocol  3. Stat EKG  4. Perfusion studies per neuro  5. Monitor blood pressure, nicardipine to keep systolic blood pressure less than or equal to 180 mmHg  6. A.m. Labs  7. Vitamin replacement per alcohol withdrawal protocol  8. GI prophylaxis: Famotidine  9. DVT prophylaxis: SCD  10. CIWA protocol  11. Initiate Symbicort as a substitution for Advair  12. DuoNeb 4 times daily  13. Glaucoma eyedrops  14. NIH stroke scale per protocol  15. Echocardiogram to exclude cardioembolic event    Critical Care time spent in direct patient care: 35minutes (excluding procedure time, if applicable) including high complexity decision making to assess, manipulate, and support vital organ system failure in this individual who has  impairment of one or more vital organ systems such that there is a high probability of imminent or life threatening deterioration in the patient’s condition.    GIRISH Paredes, [facility]  Pulmonary and Critical Care Service    I reviewed the patient's chart, he is an office patient of [facility], interviewed the patient, performed the above physical examination, reviewed his outside records, performed the assessment and plan and modified the above note to reflect my additions and findings after discussing the case and plan with the APRN    Ela Myrick MD      CC: Gillian Acuña APRN

## 2019-11-11 ENCOUNTER — APPOINTMENT (OUTPATIENT)
Dept: CT IMAGING | Facility: HOSPITAL | Age: 74
End: 2019-11-11

## 2019-11-11 ENCOUNTER — APPOINTMENT (OUTPATIENT)
Dept: CARDIOLOGY | Facility: HOSPITAL | Age: 74
End: 2019-11-11

## 2019-11-11 LAB
ALBUMIN SERPL-MCNC: 3.4 G/DL (ref 3.5–5.2)
ALBUMIN/GLOB SERPL: 1 G/DL
ALP SERPL-CCNC: 58 U/L (ref 39–117)
ALT SERPL W P-5'-P-CCNC: 11 U/L (ref 1–41)
ANION GAP SERPL CALCULATED.3IONS-SCNC: 12 MMOL/L (ref 5–15)
AST SERPL-CCNC: 21 U/L (ref 1–40)
BH CV ECHO MEAS - AO ROOT AREA (BSA CORRECTED): 1.7
BH CV ECHO MEAS - AO ROOT AREA: 11.5 CM^2
BH CV ECHO MEAS - AO ROOT DIAM: 3.8 CM
BH CV ECHO MEAS - BSA(HAYCOCK): 2.3 M^2
BH CV ECHO MEAS - BSA: 2.2 M^2
BH CV ECHO MEAS - BZI_BMI: 32 KILOGRAMS/M^2
BH CV ECHO MEAS - BZI_METRIC_HEIGHT: 177.8 CM
BH CV ECHO MEAS - BZI_METRIC_WEIGHT: 101.2 KG
BH CV ECHO MEAS - EDV(CUBED): 96.3 ML
BH CV ECHO MEAS - EDV(MOD-SP4): 183 ML
BH CV ECHO MEAS - EDV(TEICH): 96.6 ML
BH CV ECHO MEAS - EF(CUBED): 50 %
BH CV ECHO MEAS - EF(MOD-BP): 51 %
BH CV ECHO MEAS - EF(MOD-SP4): 57.4 %
BH CV ECHO MEAS - EF(TEICH): 42.2 %
BH CV ECHO MEAS - ESV(CUBED): 48.1 ML
BH CV ECHO MEAS - ESV(MOD-SP4): 78 ML
BH CV ECHO MEAS - ESV(TEICH): 55.8 ML
BH CV ECHO MEAS - FS: 20.7 %
BH CV ECHO MEAS - IVS/LVPW: 0.86
BH CV ECHO MEAS - IVSD: 1.3 CM
BH CV ECHO MEAS - LA DIMENSION: 3.3 CM
BH CV ECHO MEAS - LA/AO: 0.85
BH CV ECHO MEAS - LAT PEAK E' VEL: 6.3 CM/SEC
BH CV ECHO MEAS - LATERAL E/E' RATIO: 10.2
BH CV ECHO MEAS - LV DIASTOLIC VOL/BSA (35-75): 83.7 ML/M^2
BH CV ECHO MEAS - LV MASS(C)D: 206.9 GRAMS
BH CV ECHO MEAS - LV MASS(C)DI: 94.6 GRAMS/M^2
BH CV ECHO MEAS - LV MAX PG: 1.6 MMHG
BH CV ECHO MEAS - LV MEAN PG: 0.82 MMHG
BH CV ECHO MEAS - LV SYSTOLIC VOL/BSA (12-30): 35.7 ML/M^2
BH CV ECHO MEAS - LV V1 MAX: 63.7 CM/SEC
BH CV ECHO MEAS - LV V1 MEAN: 42.3 CM/SEC
BH CV ECHO MEAS - LV V1 VTI: 12.8 CM
BH CV ECHO MEAS - LVIDD: 4.6 CM
BH CV ECHO MEAS - LVIDS: 3.6 CM
BH CV ECHO MEAS - LVLD AP4: 8.3 CM
BH CV ECHO MEAS - LVLS AP4: 7.9 CM
BH CV ECHO MEAS - LVOT AREA (M): 4.5 CM^2
BH CV ECHO MEAS - LVOT AREA: 4.4 CM^2
BH CV ECHO MEAS - LVOT DIAM: 2.4 CM
BH CV ECHO MEAS - LVPWD: 1.3 CM
BH CV ECHO MEAS - MED PEAK E' VEL: 5.6 CM/SEC
BH CV ECHO MEAS - MEDIAL E/E' RATIO: 11.4
BH CV ECHO MEAS - MV A MAX VEL: 77 CM/SEC
BH CV ECHO MEAS - MV DEC TIME: 0.15 SEC
BH CV ECHO MEAS - MV E MAX VEL: 65.2 CM/SEC
BH CV ECHO MEAS - MV E/A: 0.85
BH CV ECHO MEAS - PA ACC SLOPE: 510.2 CM/SEC^2
BH CV ECHO MEAS - PA ACC TIME: 0.1 SEC
BH CV ECHO MEAS - PA PR(ACCEL): 35 MMHG
BH CV ECHO MEAS - RVDD: 2.6 CM
BH CV ECHO MEAS - SI(CUBED): 22.1 ML/M^2
BH CV ECHO MEAS - SI(LVOT): 25.6 ML/M^2
BH CV ECHO MEAS - SI(MOD-SP4): 48 ML/M^2
BH CV ECHO MEAS - SI(TEICH): 18.6 ML/M^2
BH CV ECHO MEAS - SV(CUBED): 48.2 ML
BH CV ECHO MEAS - SV(LVOT): 56.1 ML
BH CV ECHO MEAS - SV(MOD-SP4): 105 ML
BH CV ECHO MEAS - SV(TEICH): 40.8 ML
BH CV ECHO MEASUREMENTS AVERAGE E/E' RATIO: 10.96
BH CV VAS BP LEFT ARM: NORMAL MMHG
BH CV XLRA - RV BASE: 4.1 CM
BH CV XLRA - RV LENGTH: 6.8 CM
BH CV XLRA - RV MID: 3.4 CM
BILIRUB SERPL-MCNC: 0.9 MG/DL (ref 0.2–1.2)
BUN BLD-MCNC: 9 MG/DL (ref 8–23)
BUN/CREAT SERPL: 11.7 (ref 7–25)
CALCIUM SPEC-SCNC: 8.2 MG/DL (ref 8.6–10.5)
CHLORIDE SERPL-SCNC: 102 MMOL/L (ref 98–107)
CHOLEST SERPL-MCNC: 241 MG/DL (ref 0–200)
CO2 SERPL-SCNC: 24 MMOL/L (ref 22–29)
CREAT BLD-MCNC: 0.77 MG/DL (ref 0.76–1.27)
CRP SERPL-MCNC: 0.75 MG/DL (ref 0–0.5)
DEPRECATED RDW RBC AUTO: 46.5 FL (ref 37–54)
ERYTHROCYTE [DISTWIDTH] IN BLOOD BY AUTOMATED COUNT: 12.7 % (ref 12.3–15.4)
ERYTHROCYTE [SEDIMENTATION RATE] IN BLOOD: 16 MM/HR (ref 0–20)
GFR SERPL CREATININE-BSD FRML MDRD: 99 ML/MIN/1.73
GLOBULIN UR ELPH-MCNC: 3.4 GM/DL
GLUCOSE BLD-MCNC: 101 MG/DL (ref 65–99)
GLUCOSE BLDC GLUCOMTR-MCNC: 94 MG/DL (ref 70–130)
HBA1C MFR BLD: 5.8 % (ref 4.8–5.6)
HCT VFR BLD AUTO: 43.5 % (ref 37.5–51)
HDLC SERPL-MCNC: 29 MG/DL (ref 40–60)
HGB BLD-MCNC: 14.3 G/DL (ref 13–17.7)
LDLC SERPL CALC-MCNC: 166 MG/DL (ref 0–100)
LDLC/HDLC SERPL: 5.72 {RATIO}
LV EF 2D ECHO EST: 42 %
MAGNESIUM SERPL-MCNC: 2.1 MG/DL (ref 1.6–2.4)
MAXIMAL PREDICTED HEART RATE: 146 BPM
MCH RBC QN AUTO: 32.5 PG (ref 26.6–33)
MCHC RBC AUTO-ENTMCNC: 32.9 G/DL (ref 31.5–35.7)
MCV RBC AUTO: 98.9 FL (ref 79–97)
PLATELET # BLD AUTO: 190 10*3/MM3 (ref 140–450)
PMV BLD AUTO: 11 FL (ref 6–12)
POTASSIUM BLD-SCNC: 3.6 MMOL/L (ref 3.5–5.2)
PROT SERPL-MCNC: 6.8 G/DL (ref 6–8.5)
RBC # BLD AUTO: 4.4 10*6/MM3 (ref 4.14–5.8)
SODIUM BLD-SCNC: 138 MMOL/L (ref 136–145)
STRESS TARGET HR: 124 BPM
TRIGL SERPL-MCNC: 231 MG/DL (ref 0–150)
URATE SERPL-MCNC: 8.3 MG/DL (ref 3.4–7)
VLDLC SERPL-MCNC: 46.2 MG/DL
WBC NRBC COR # BLD: 7.96 10*3/MM3 (ref 3.4–10.8)

## 2019-11-11 PROCEDURE — 82962 GLUCOSE BLOOD TEST: CPT

## 2019-11-11 PROCEDURE — 99232 SBSQ HOSP IP/OBS MODERATE 35: CPT | Performed by: INTERNAL MEDICINE

## 2019-11-11 PROCEDURE — 83735 ASSAY OF MAGNESIUM: CPT | Performed by: INTERNAL MEDICINE

## 2019-11-11 PROCEDURE — 93005 ELECTROCARDIOGRAM TRACING: CPT | Performed by: NURSE PRACTITIONER

## 2019-11-11 PROCEDURE — 99232 SBSQ HOSP IP/OBS MODERATE 35: CPT | Performed by: PSYCHIATRY & NEUROLOGY

## 2019-11-11 PROCEDURE — 85027 COMPLETE CBC AUTOMATED: CPT | Performed by: INTERNAL MEDICINE

## 2019-11-11 PROCEDURE — 25010000002 KETOROLAC TROMETHAMINE PER 15 MG: Performed by: INTERNAL MEDICINE

## 2019-11-11 PROCEDURE — 85652 RBC SED RATE AUTOMATED: CPT | Performed by: INTERNAL MEDICINE

## 2019-11-11 PROCEDURE — 93306 TTE W/DOPPLER COMPLETE: CPT

## 2019-11-11 PROCEDURE — 83036 HEMOGLOBIN GLYCOSYLATED A1C: CPT | Performed by: INTERNAL MEDICINE

## 2019-11-11 PROCEDURE — 97161 PT EVAL LOW COMPLEX 20 MIN: CPT

## 2019-11-11 PROCEDURE — 94799 UNLISTED PULMONARY SVC/PX: CPT

## 2019-11-11 PROCEDURE — 80061 LIPID PANEL: CPT | Performed by: INTERNAL MEDICINE

## 2019-11-11 PROCEDURE — 97165 OT EVAL LOW COMPLEX 30 MIN: CPT

## 2019-11-11 PROCEDURE — 86140 C-REACTIVE PROTEIN: CPT | Performed by: INTERNAL MEDICINE

## 2019-11-11 PROCEDURE — 92523 SPEECH SOUND LANG COMPREHEN: CPT

## 2019-11-11 PROCEDURE — 93010 ELECTROCARDIOGRAM REPORT: CPT | Performed by: INTERNAL MEDICINE

## 2019-11-11 PROCEDURE — 70450 CT HEAD/BRAIN W/O DYE: CPT

## 2019-11-11 PROCEDURE — 84550 ASSAY OF BLOOD/URIC ACID: CPT | Performed by: INTERNAL MEDICINE

## 2019-11-11 PROCEDURE — 93306 TTE W/DOPPLER COMPLETE: CPT | Performed by: INTERNAL MEDICINE

## 2019-11-11 PROCEDURE — 99222 1ST HOSP IP/OBS MODERATE 55: CPT | Performed by: INTERNAL MEDICINE

## 2019-11-11 PROCEDURE — 25010000002 SULFUR HEXAFLUORIDE MICROSPH 60.7-25 MG RECONSTITUTED SUSPENSION: Performed by: INTERNAL MEDICINE

## 2019-11-11 PROCEDURE — 80053 COMPREHEN METABOLIC PANEL: CPT | Performed by: INTERNAL MEDICINE

## 2019-11-11 RX ORDER — COLCHICINE 0.6 MG/1
0.6 TABLET ORAL EVERY 12 HOURS SCHEDULED
Status: DISCONTINUED | OUTPATIENT
Start: 2019-11-12 | End: 2019-11-12 | Stop reason: HOSPADM

## 2019-11-11 RX ORDER — CARVEDILOL 6.25 MG/1
6.25 TABLET ORAL 2 TIMES DAILY WITH MEALS
Status: DISCONTINUED | OUTPATIENT
Start: 2019-11-11 | End: 2019-11-12 | Stop reason: HOSPADM

## 2019-11-11 RX ORDER — KETOROLAC TROMETHAMINE 15 MG/ML
15 INJECTION, SOLUTION INTRAMUSCULAR; INTRAVENOUS EVERY 6 HOURS PRN
Status: DISCONTINUED | OUTPATIENT
Start: 2019-11-11 | End: 2019-11-12

## 2019-11-11 RX ORDER — FOLIC ACID 1 MG/1
1 TABLET ORAL DAILY
Status: DISCONTINUED | OUTPATIENT
Start: 2019-11-11 | End: 2019-11-12 | Stop reason: HOSPADM

## 2019-11-11 RX ORDER — COLCHICINE 0.6 MG/1
0.6 TABLET ORAL
Status: COMPLETED | OUTPATIENT
Start: 2019-11-11 | End: 2019-11-11

## 2019-11-11 RX ORDER — COLCHICINE 0.6 MG/1
0.6 TABLET ORAL DAILY
Status: DISCONTINUED | OUTPATIENT
Start: 2019-11-11 | End: 2019-11-11

## 2019-11-11 RX ORDER — THIAMINE MONONITRATE (VIT B1) 100 MG
200 TABLET ORAL 2 TIMES DAILY
Status: DISCONTINUED | OUTPATIENT
Start: 2019-11-11 | End: 2019-11-12

## 2019-11-11 RX ORDER — COLCHICINE 0.6 MG/1
0.6 TABLET ORAL ONCE
Status: COMPLETED | OUTPATIENT
Start: 2019-11-11 | End: 2019-11-11

## 2019-11-11 RX ADMIN — ASPIRIN 325 MG ORAL TABLET 325 MG: 325 PILL ORAL at 20:22

## 2019-11-11 RX ADMIN — BUDESONIDE AND FORMOTEROL FUMARATE DIHYDRATE 2 PUFF: 160; 4.5 AEROSOL RESPIRATORY (INHALATION) at 12:54

## 2019-11-11 RX ADMIN — ACETAMINOPHEN 650 MG: 325 TABLET ORAL at 18:01

## 2019-11-11 RX ADMIN — IPRATROPIUM BROMIDE AND ALBUTEROL SULFATE 3 ML: 2.5; .5 SOLUTION RESPIRATORY (INHALATION) at 20:50

## 2019-11-11 RX ADMIN — IPRATROPIUM BROMIDE AND ALBUTEROL SULFATE 3 ML: 2.5; .5 SOLUTION RESPIRATORY (INHALATION) at 16:35

## 2019-11-11 RX ADMIN — MULTIVITAMIN TABLET 1 TABLET: TABLET at 10:04

## 2019-11-11 RX ADMIN — COLCHICINE 0.6 MG: 0.6 TABLET, FILM COATED ORAL at 18:26

## 2019-11-11 RX ADMIN — CARVEDILOL 6.25 MG: 6.25 TABLET, FILM COATED ORAL at 18:01

## 2019-11-11 RX ADMIN — COLCHICINE 0.6 MG: 0.6 TABLET, FILM COATED ORAL at 12:04

## 2019-11-11 RX ADMIN — BUDESONIDE AND FORMOTEROL FUMARATE DIHYDRATE 2 PUFF: 160; 4.5 AEROSOL RESPIRATORY (INHALATION) at 20:50

## 2019-11-11 RX ADMIN — ATORVASTATIN CALCIUM 80 MG: 40 TABLET, FILM COATED ORAL at 20:22

## 2019-11-11 RX ADMIN — Medication 100 MG: at 10:05

## 2019-11-11 RX ADMIN — SODIUM CHLORIDE, PRESERVATIVE FREE 10 ML: 5 INJECTION INTRAVENOUS at 20:23

## 2019-11-11 RX ADMIN — COLCHICINE 0.6 MG: 0.6 TABLET, FILM COATED ORAL at 14:05

## 2019-11-11 RX ADMIN — AZITHROMYCIN 250 MG: 250 TABLET, FILM COATED ORAL at 10:04

## 2019-11-11 RX ADMIN — LATANOPROST 1 DROP: 50 SOLUTION OPHTHALMIC at 20:22

## 2019-11-11 RX ADMIN — FOLIC ACID 1 MG: 1 TABLET ORAL at 10:04

## 2019-11-11 RX ADMIN — Medication 200 MG: at 20:22

## 2019-11-11 RX ADMIN — IPRATROPIUM BROMIDE AND ALBUTEROL SULFATE 3 ML: 2.5; .5 SOLUTION RESPIRATORY (INHALATION) at 12:52

## 2019-11-11 RX ADMIN — KETOROLAC TROMETHAMINE 15 MG: 15 INJECTION, SOLUTION INTRAMUSCULAR; INTRAVENOUS at 18:26

## 2019-11-11 RX ADMIN — SODIUM CHLORIDE 75 ML/HR: 9 INJECTION, SOLUTION INTRAVENOUS at 07:41

## 2019-11-11 RX ADMIN — SULFUR HEXAFLUORIDE 3 ML: KIT at 10:10

## 2019-11-11 RX ADMIN — ACETAMINOPHEN 650 MG: 325 TABLET ORAL at 07:40

## 2019-11-11 NOTE — THERAPY EVALUATION
Acute Care - Speech Language Pathology Initial Evaluation  [facility]  Cognitive-Communication Evaluation     Patient Name: Oswaldo Boggs  : 1945  MRN: [id number]  Today's Date: 2019               Admit Date: 11/10/2019     Visit Dx:    ICD-10-CM ICD-9-CM   1. Cerebrovascular accident (CVA) due to embolism of cerebral artery (CMS/HCC) I63.40 434.11   2. Cognitive communication deficit R41.841 799.52     Patient Active Problem List   Diagnosis   • Bronchiectasis (CMS/HCC)   • Dyslipidemia   • PPD positive   • AIS s/p tPA   • PAF (paroxysmal atrial fibrillation) (CMS/HCC)   • Hypertension   • Alcohol abuse     Past Medical History:   Diagnosis Date   • Glaucoma    • History of positive PPD, untreated    • Hypertension    • Nephrolithiasis    • PAF (paroxysmal atrial fibrillation) (CMS/HCC)    • Personal history of renal calculi     x1, passed spontaneously      Past Surgical History:   Procedure Laterality Date   • CATARACT EXTRACTION     • COLONOSCOPY     • INGUINAL HERNIA REPAIR      x2   • UMBILICAL HERNIA REPAIR          SLP EVALUATION (last 72 hours)      SLP SLC Evaluation     Row Name 19 1445                   Communication Assessment/Intervention    Document Type  evaluation  -AC        Subjective Information  no complaints  -AC        Patient Observations  alert;cooperative  -AC        Patient/Family Observations  Spouse @ bedside.  -AC        Patient Effort  good  -AC           General Information    Patient Profile Reviewed  yes  -AC        Pertinent History Of Current Problem  75yo adm w/ mult acute embolic strokes (mostly R hem) s/p tPA. Passed RN CVA swallow screen. Hx alc abuse.   -AC        Precautions/Limitations, Vision  WFL;for purposes of eval  -AC        Precautions/Limitations, Hearing  WFL;for purposes of eval  -AC        Prior Level of Function-Communication  WFL  -AC        Plans/Goals Discussed with  patient;spouse/S.O.;agreed upon  -AC        Barriers to Rehab  none  identified  -AC        Patient's Goals for Discharge  return to home;return to all previous roles/activities  -        Family Goals for Discharge  family did not state  -AC           Pain Assessment    Additional Documentation  Pain Scale: FACES Pre/Post-Treatment (Group)  -AC           Pain Scale: FACES Pre/Post-Treatment    Pain: FACES Scale, Pretreatment  0-->no hurt  -AC        Pain: FACES Scale, Post-Treatment  0-->no hurt  -AC           Comprehension Assessment/Intervention    Comprehension Assessment/Intervention  Auditory Comprehension;Reading Comprehension  -AC           Auditory Comprehension Assessment/Intervention    Auditory Comprehension (Communication)  WFL  -AC        Answers Questions (Communication)  WFL;complex;yes/no;wh questions  -AC        Able to Follow Commands (Communication)  WFL;2-step  -AC        Narrative Discourse  WFL;conversational level  -AC           Reading Comprehension Assessment/Intervention    Paragraph Level  mild impairment;other (see comments) mult choice comprehension ?s - suspect r/t memory deficits  -AC           Expression Assessment/Intervention    Expression Assessment/Intervention  verbal expression;graphic expression  -AC           Verbal Expression Assessment/Intervention    Verbal Expression  WFL  -AC        Automatic Speech (Communication)  WFL;response to greeting  -AC        Responsive Naming  WFL;complex  -AC        Confrontational Naming  WFL;high frequency  -AC        Conversational Discourse/Fluency  WFL  -AC           Graphic Expression Assessment/Intervention    Graphic Expression  WFL;dominant hand  -AC        Biographical Information  WFL;name  -AC        Graphic Expression, Comment  Pt able to generate complex written sentence w/ 2 key words w/ 100% acc.  -AC           Motor Speech Assessment/Intervention    Motor Speech Function  mild impairment;unfamiliar listener;familiar listener  -AC        Characteristics Consistent with Dysarthria  decreased  articulation  -AC        Conversational Speech (Communication)  mild impairment;moderate complexity  -AC        Motor Speech, Comment  Pt 90% intelligible to unfamiliar listener. Pt reported speech close to baseline, but still slurred.  -AC           Cognitive Assessment Intervention- SLP    Cognitive Function (Cognition)  mild impairment  -AC        Orientation Status (Cognition)  WFL;person;place;time;situation  -AC        Memory (Cognitive)  mild impairment;short-term;immediate;delayed;other (see comments) immediate: 4/5 words; 2-min delay: 3/5 words  -AC        Attention (Cognitive)  WFL;sustained  -AC        Thought Organization (Cognitive)  WFL;concrete divergent;abstract convergent  -AC        Reasoning (Cognitive)  WFL;simple;deductive  -AC        Problem Solving (Cognitive)  mild impairment;multifactorial  -AC           SLP Clinical Impressions    SLP Diagnosis  Mild dysarthria c/b imprecise articulation. 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  -AC        Rehab Potential/Prognosis  good  -AC        SLC Criteria for Skilled Therapy Interventions Met  yes  -AC        Functional Impact  difficulty in expressing complex messages;difficulty completing home management task;decreased ability to respond to situations safely  -AC           Recommendations    Therapy Frequency (SLP SLC)  5 days per week  -AC        Predicted Duration Therapy Intervention (Days)  until discharge  -AC        Anticipated Dischage Disposition  anticipate therapy at next level of care  -AC          User Key  (r) = Recorded By, (t) = Taken By, (c) = Cosigned By    Initials Name Effective Dates    AC Sarah Becker, MS CCC-SLP 07/27/17 -              EDUCATION  The patient has been educated in the following areas:     Cognitive Impairment Communication Impairment.    SLP Recommendation and Plan  SLP Diagnosis: Mild dysarthria c/b imprecise articulation. Mild  cognitive-communication disorder c/b deficits in multifactorial problem solving and delayed recall.     SLC Criteria for Skilled Therapy Interventions Met: yes  Anticipated Dischage Disposition: anticipate therapy at next level of care     Predicted Duration Therapy Intervention (Days): until discharge    Plan of Care Reviewed With: spouse, patient      SLP GOALS     Row Name 11/11/19 1445             Articulation Goal 1 (SLP)    Improve Articulation Goal 1 (SLP)  by over-articulating in connected speech;90%;independently (over 90% accuracy)  -AC      Time Frame (Articulation Goal 1, SLP)  short term goal (STG);by discharge  -AC         Memory Skills Goal 1 (SLP)    Improve Memory Skills Through Goal 1 (SLP)  recalling unrelated word lists immediately;recalling unrelated word lists with an imposed delay;use memory strategies;80%;with minimal cues (75-90%)  -AC      Time Frame (Memory Skills Goal 1, SLP)  short term goal (STG);by discharge  -AC         Functional Problem Solving Skills Goal 1 (SLP)    Improve Problem Solving Through Goal 1 (SLP)  determine solutions to multifactorial problems;complete organization/home management task;80%;with minimal cues (75-90%)  -AC      Time Frame (Problem Solving Goal 1, SLP)  short term goal (STG);by discharge  -AC         Additional Goal 1 (SLP)    Additional Goal 1, SLP  LTG: Pt will improve cognitive-communication skills in order to participate in care w/ 100% acc w/o cues.  -AC      Time Frame (Additional Goal 1, SLP)  by discharge  -AC        User Key  (r) = Recorded By, (t) = Taken By, (c) = Cosigned By    Initials Name Provider Type    Sarah Eaton MS CCC-SLP Speech and Language Pathologist                  Time Calculation:     Time Calculation- SLP     Row Name 11/11/19 1610             Time Calculation- SLP    SLP Start Time  1445  -AC      SLP Received On  11/11/19  -        User Key  (r) = Recorded By, (t) = Taken By, (c) = Cosigned By    Initials Name  Provider Type    AC Sarah Becker, MS CCC-SLP Speech and Language Pathologist          Therapy Charges for Today     Code Description Service Date Service Provider Modifiers Qty    [id number]  ST EVAL SPEECH AND PROD W LANG  4 11/11/2019 Sarah Becker, MS CCC-SLP GN 1                     Sarah Becker MS CCC-SLP  11/11/2019

## 2019-11-11 NOTE — PLAN OF CARE
Problem: Patient Care Overview  Goal: Plan of Care Review  Outcome: Ongoing (interventions implemented as appropriate)   11/11/19 0902 11/11/19 1609 11/11/19 5662   Coping/Psychosocial   Plan of Care Reviewed With --  spouse;patient --    Plan of Care Review   Progress improving --  --    OTHER   Outcome Summary --  --  Neuro assessment stable, cardiology consulted today, plan for JAE tomorrow, VSS, WCTM

## 2019-11-11 NOTE — CONSULTS
NEUROLOGY CONSULTATION    Oswaldo Boggs    Consulting Physician: Dr. Myrick    Chief Complaint/Reason for Consultation: dizziness, dysarthria    HPI:   Subjective     Oswaldo Boggs is a 74 y.o. M in his usual state of health until this afternoon when he developed dizziness described as feeling unsteady and slurred speech.  He presented to [facility].  Initial NIHSS 2.  He was treated with IV tPA and transferred to [facility] for further evaluation and management.   Dizziness has improved. Still with slurred speech.  No prior similar episodes.  Reportedly has hx of Afib in the past.  Not on anticoagulation or antiplatelets regularly.          Patient Active Problem List    Diagnosis   • *CVA (cerebral vascular accident) (CMS/HCC) post tPA [I63.9]   • PAF (paroxysmal atrial fibrillation) (CMS/[facility]) [I48.0]   • Hypertension [I10]   • Alcohol abuse [F10.10]   • Bronchiectasis (CMS/[facility]) [J47.9]   • Dyslipidemia [E78.5]   • PPD positive [R76.11]     Past Surgical History:   Procedure Laterality Date   • CATARACT EXTRACTION     • COLONOSCOPY     • INGUINAL HERNIA REPAIR      x2   • UMBILICAL HERNIA REPAIR       Family History   Problem Relation Age of Onset   • COPD Mother    • Alcohol abuse Father    • Lung disease Sister      Social History     Socioeconomic History   • Marital status:      Spouse name: Not on file   • Number of children: 3   • Years of education: Not on file   • Highest education level: Not on file   Tobacco Use   • Smoking status: Never Smoker   • Smokeless tobacco: Never Used   Substance and Sexual Activity   • Alcohol use: Yes     Comment: 2 or 3 shots of liquor a day   • Drug use: No   • Sexual activity: Defer   Social History Narrative    Retired  heavy equipment, sold cars        Review of Systems  Review of Systems as per HPI and PMHx, AON    Objective     Vital signs in last 24 hours:  Temp:  [98 °F (36.7 °C)] 98 °F (36.7 °C)  Heart Rate:  [78-86] 81  Resp:   [16-18] 16  BP: (145-167)/(79-98) 167/95    General: NAD  HEENT: NCAT  Ext: warm and well perfused  Skin: no rashes  Neuro: awake, alert and oriented x3. Language fluent.  Dysarthric. PERRL, EOMI, L inferior quadrantanopia, face symm. Tongue, palate midline.  Motor: normal strength and tone. No pronator drift. SILT.  No extinction to DSS.        Current Facility-Administered Medications:   •  [START ON 11/11/2019] aspirin tablet 325 mg, 325 mg, Oral, Daily **OR** [START ON 11/11/2019] aspirin suppository 300 mg, 300 mg, Rectal, Daily, Ela Myrick MD  •  atorvastatin (LIPITOR) tablet 80 mg, 80 mg, Oral, Nightly, Ela Myrick MD  •  [START ON 11/11/2019] azithromycin (ZITHROMAX) tablet 250 mg, 250 mg, Oral, Once per day on Mon Wed Fri, Ela Myrick MD  •  budesonide-formoterol (SYMBICORT) 160-4.5 MCG/ACT inhaler 2 puff, 2 puff, Inhalation, BID - RT, Ela Myrick MD  •  colchicine tablet 0.6 mg, 0.6 mg, Oral, Q12H PRN, Ela Myrick MD  •  famotidine (PEPCID) injection 20 mg, 20 mg, Intravenous, Q12H, Munir Melgar APRN  •  [START ON 11/11/2019] thiamine (VITAMIN B-1) tablet 100 mg, 100 mg, Oral, Daily **AND** [START ON 11/11/2019] multivitamin (THERAGRAN) tablet 1 tablet, 1 tablet, Oral, Daily **AND** [START ON 11/11/2019] folic acid (FOLVITE) tablet 1 mg, 1 mg, Oral, Daily, Munir Melgra APRN  •  ipratropium-albuterol (DUO-NEB) nebulizer solution 3 mL, 3 mL, Nebulization, 4x Daily - RT, Ela Myrick MD  •  latanoprost (XALATAN) 0.005 % ophthalmic solution 1 drop, 1 drop, Both Eyes, Nightly, Ela Myrick MD  •  LORazepam (ATIVAN) tablet 0.5 mg, 0.5 mg, Oral, Q2H PRN **OR** LORazepam (ATIVAN) injection 0.5 mg, 0.5 mg, Intravenous, Q2H PRN **OR** LORazepam (ATIVAN) tablet 1 mg, 1 mg, Oral, Q1H PRN **OR** LORazepam (ATIVAN) injection 1 mg, 1 mg, Intravenous, Q1H PRN **OR** LORazepam (ATIVAN) injection 1 mg, 1 mg, Intravenous, Q15 Min  PRN **OR** LORazepam (ATIVAN) injection 1 mg, 1 mg, Intramuscular, Q15 Min PRN **OR** LORazepam (ATIVAN) injection 2 mg, 2 mg, Intravenous, Q1H PRN **OR** LORazepam (ATIVAN) tablet 2 mg, 2 mg, Oral, Q1H PRN, Munir Melgar APRN  •  niCARdipine (CARDENE) 25 mg/250 mL (0.1 mg/mL) 0.9% NS infusion, 5-15 mg/hr, Intravenous, Titrated, Ela Myrick MD  •  sodium chloride 0.9 % flush 10 mL, 10 mL, Intravenous, Q12H, Ela Myrick MD  •  sodium chloride 0.9 % flush 10 mL, 10 mL, Intravenous, PRN, Ela Myrick MD  •  sodium chloride 0.9 % infusion, 75 mL/hr, Intravenous, Continuous, Ela Myrick MD, Last Rate: 75 mL/hr at 11/10/19 1852, 75 mL/hr at 11/10/19 1852  •  thiamine (B-1) 100 mg in sodium chloride 0.9 % 100 mL IVPB, 100 mg, Intravenous, Once **OR** thiamine (VITAMIN B-1) tablet 100 mg, 100 mg, Oral, Once, Munir Melgar APRN    Allergies   Allergen Reactions   • Amoxil [Amoxicillin] Rash         Imaging  CT CEREBRAL PERFUSION W WO CONTRAST noncontrast head CT     HISTORY:   Dizziness and slurred speech. Last known well around 1500 today. TPA at a outside facility with transfer     TECHNIQUE:   Axial CT images of the brain without and with intravenous contrast using cerebral perfusion protocol. Post-processing parametric maps were created using RAPID software and reviewed. Radiation dose reduction techniques included automated exposure control  or exposure modulation based on body size. CT and nuclear cardiology exams in the last 12 months: 0. Imaging obtained during the intravenous administration of nonionic contrast media with a dose recorded in the patient's chart. Preperfusion noncontrast  head CT also obtained. COMPARISON:   None available     FINDINGS:     Noncontrast head CT:     There is no evidence for acute intracranial hemorrhage. There is ill-defined low-attenuation at the medial aspect of the right parieto-occipital lobe measuring about 2 cm in largest  dimension. This could reflect age-indeterminate ischemic insult. Is best  pursued with follow-up MRI if the patient is a candidate. There is no extra-axial fluid collection or intracranial mass affect. There is mild generalized volume loss is mild white matter low-attenuation is nonspecific but likely due to small vessel  disease. There is partial opacification the ethmoid air cells and there is mucosal disease within the hypoplastic right maxillary sinus with small air-fluid level. There are vascular calcifications of the base of the brain.     CT PERFUSION:  Arterial input function is optimal.      No significant ischemic penumbra documented. Perfusion is essentially within the range of normal. Tmax greater than 4 seconds is documented 25 mL's of the deep white matter bilateral cerebral hemispheres.     IMPRESSION:     1. On noncontrast head CT, there is no evidence for acute intracranial hemorrhage. There is a vague area of low attenuation at the medial aspect of the right parieto-occipital lobe which could be an area of ischemia but it does not correspond to a  perfusion abnormality on the subsequent perfusion portion of the study. It is best further characterized with a MRI if the patient is a candidate.  2. No ischemic penumbra is documented on the perfusion imaging     3. Paranasal sinus disease including a sinus air-fluid level right maxillary sinus         Assessment/Plan     1. Ischemic Stroke s/p tPA  2. Hx of Paroxysmal Afib  3. HL  4. Hypertension      -post tPA protocol  -MRI brain  -Echo  -carotid duplex  -atorvastatin 80 mg daily  -will need to discuss anticoagulation      Prince Clement MD

## 2019-11-11 NOTE — PROGRESS NOTES
Multidisciplinary Rounds    Time: 20min  Patient Name: Oswaldo Boggs  Date of Encounter: 11/11/19 10:05 AM  MRN: [id number]  Admission date: 11/10/2019      Reason for visit: MDR. RD to continue to follow per protocol.     Additional information obtained during MDR: Pt admitted (11/10) with CVA, s/p tPA. 25% PO intake of past 1 documented meal.     Current diet: Diet Regular; Cardiac      Intervention:  Follow treatment plan  Care plan reviewed    Follow up:   Per protocol      Liz Duncan MS RD/MANAS CNSC  10:05 AM

## 2019-11-11 NOTE — CONSULTS
Oswaldo Boggs  [id number]  1945   LOS: 1 day   Patient Care Team:  HEALTHCARE PROVIDER: GIRISH Restrepo   CARDIOLOGIST: Charbel Hodge DO  PULMONOLOGIST: Sobeida Nelson MD    Mr. Boggs is a 74-year-old  white male from [place], [place], retired  .    Chief Complaint: Embolic CVA, history of PAF    Problem List:  1. Tachycardia versus subjective questionable PAF versus nonsustained V. tach:  a. CHADsVasc 5, no prior anticoagulation  b. Echocardiogram 11/11/2019: LVEF 42%, mild dilation of the aortic root and the sinuses of Valsalva present, LV wall thickness consistent with mild concentric hypertrophy, LV diastolic dysfunction grade 1 consistent with impaired relaxation, no LV mass or thrombus, normal RV cavity size, wall thickness and septal motion noted with mildly reduced RV systolic function, saline test results negative, no pulmonary hypertension, no pericardial effusion, no significant structural valvular abnormality demonstrated  2. 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 Hypertensive cardiovascular disease  a. Remote stress test approximately 8 months ago; negative per patient-data deficit  b. Residual class I symptoms  4. Hypertension  5. Dyslipidemia; on statin therapy  6. Type 2 diabetes mellitus with hemoglobin A1c 5.8% November 2019  7. Alcohol use: at least a fifith daily, quit 4 days prior to admission  8. Bronchiectasis with 2 L nocturnal oxygen  9. Mild obesity: BMI 31.95  10. Nephrolithiasis  11. Glaucoma  12. 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 Surgical  "history:  a. Cataract extraction  b. Inguinal hernia repair x2  c. Umbilical hernia repair      Allergies   Allergen Reactions   • Amoxil [Amoxicillin] Rash     Medications Prior to Admission   Medication Sig Dispense Refill Last Dose   • albuterol (PROAIR HFA) 108 (90 Base) MCG/ACT inhaler Inhale 2 puffs Every 6 (Six) Hours As Needed for Wheezing. 1 inhaler 5 Taking   • azithromycin (ZITHROMAX) 250 MG tablet Take 1 tablet by mouth 3 (Three) Times a Week. Mon, Wed, Fri 36 tablet 4    • colchicine 0.6 MG tablet As Needed.   Taking   • fluticasone-salmeterol (ADVAIR DISKUS) 500-50 MCG/DOSE DISKUS Inhale 1 puff 2 (Two) Times a Day. 3 each 4    • ibuprofen (ADVIL,MOTRIN) 600 MG tablet As Needed.   Taking   • ipratropium-albuterol (DUO-NEB) 0.5-2.5 mg/3 ml nebulizer Take 3 mL by nebulization 4 (Four) Times a Day As Needed for Wheezing. 270 mL 4    • latanoprost (XALATAN) 0.005 % ophthalmic solution Daily As Needed.   Taking   • montelukast (SINGULAIR) 10 MG tablet Take 1 tablet by mouth Every Night. 90 tablet 4      Scheduled Meds:  aspirin 325 mg Oral Daily   Or      aspirin 300 mg Rectal Daily   atorvastatin 80 mg Oral Nightly   azithromycin 250 mg Oral Once per day on Mon Wed Fri   budesonide-formoterol 2 puff Inhalation BID - RT   folic acid 1 mg Oral Daily   ipratropium-albuterol 3 mL Nebulization 4x Daily - RT   latanoprost 1 drop Both Eyes Nightly   sodium chloride 10 mL Intravenous Q12H   thiamine 200 mg Oral BID     Continuous Infusions:  niCARdipine 5-15 mg/hr     PRN Meds:.•  acetaminophen  •  sodium chloride       History of Present Illness:   This is a 74-year-old white male who was airlifted from [facility] to [facility] on 11/10/2019 for CVA after TPA administration.  The patient was last known to be \"normal\" at 1500 that day and after that time the patient was using a restroom and became very dizzy and had slurred speech and felt like he was \"drunk.\"  He had went to the restroom because he had " a clogged ear and had put hydrogen peroxide in it when he became dizzy and had to sit down.  He denied any palpitations, hemiparesis, chest pain, shortness of breath, or syncope with this episode.  He is a heavy drinker.  He believes that he probably drinks more than 1/5 of whiskey a day but has tried to quit drinking altogether for about 4 days prior to his stroke symptoms.  He denied any fever, chills, nausea, vomiting, abdominal discomfort, or edema.  He has never had a heart attack, heart catheterizations, heart monitors, and just recently with this hospitalization was started on statin therapy.  He has never been a smoker and denies any prior CVAs, TIAs, seizures, DVTs, or PEs.  He was exposed to TB as a child and is followed by a pulmonologist in [place] for bronchiectasis as well.  Patient is currently asymptomatic except for some expiratory wheezing and he has been getting duo nebs while here in the hospital.  The patient had been attending cardiopulmonary rehab prior to his stroke.  Dr. Hendrix scheduled him for JAE in the morning.  The patient had an episode in the past when he saw Dr. Hodge for tachycardia that was felt to be induced from steroid use.  He briefly used digoxin in the past.  Per RN, he had a nonsustained V. tach run last night.    Cardiac risk factors: advanced age (older than 55 for men, 65 for women), diabetes mellitus, dyslipidemia, hypertension, male gender, obesity (BMI >= 30 kg/m2) and sedentary lifestyle.    Social History     Socioeconomic History   • Marital status:      Spouse name: Not on file   • Number of children: 3   • Years of education: Not on file   • Highest education level: Not on file   Tobacco Use   • Smoking status: Never Smoker   • Smokeless tobacco: Never Used   Substance and Sexual Activity   • Alcohol use: Yes     Comment: 2 or 3 shots of liquor a day   • Drug use: No   • Sexual activity: Defer   Social History Narrative    Retired  heavy  "equipment, sold cars     Family History   Problem Relation Age of Onset   • COPD Mother    • Alcohol abuse Father    • Lung disease Sister        Review of Systems  10 point review of systems was completed, positives outlined in the HPI, and otherwise all other systems are negative.      Objective:       Physical Exam  /88   Pulse 87   Temp 97.9 °F (36.6 °C) (Oral)   Resp 18   Ht 177.8 cm (70\")   Wt 101 kg (222 lb 10.6 oz)   SpO2 100%   BMI 31.95 kg/m²       11/11/19  0600 11/11/19  1149   Weight: 101 kg (223 lb 5.2 oz) 101 kg (222 lb 10.6 oz)     Body mass index is 31.95 kg/m².    Intake/Output Summary (Last 24 hours) at 11/11/2019 1459  Last data filed at 11/11/2019 1000  Gross per 24 hour   Intake 1976.4 ml   Output 1475 ml   Net 501.4 ml       General Appearance:  Alert, cooperative, no distress, appears stated age   Head:  Normocephalic, without obvious abnormality, atraumatic   Neck: Supple, symmetrical, trachea midline, no adenopathy, thyroid: not enlarged, symmetric, no tenderness/mass/nodules, no carotid bruit or JVD   Lungs:    Expiratory wheezing to auscultation bilaterally, respirations unlabored   Heart:  Regular rate and rhythm, S1, S2 normal, no murmur, rub or gallop   Abdomen:   Soft, non-tender, no masses, no organomegaly, bowel sounds audible x4   Extremities: No edema, normal range of motion   Pulses: 2+ and symmetric   Skin: Skin color, texture, turgor normal, no rashes or lesions   Neurologic: Normal       Cardiographics  · EKG:None to review  · Echocardiogram 11/11/2019:  · Mild dilation of the aortic root and of the sinuses of Valsalva present.  · Estimated EF = 42%.  · Left ventricular systolic function is moderately decreased.  · Left ventricular wall thickness is consistent with mild concentric hypertrophy.  · Left ventricular diastolic dysfunction (grade I) consistent with impaired relaxation.  · There is no evidence of a left ventricular mass or thrombus present.  · Normal " right ventricular cavity size, wall thickness and septal motion noted with mildly reduced right ventricular systolic function.  · Saline test results are negative.  · No evidence of pulmonary hypertension is present.  · There is no evidence of pericardial effusion.  · No significant structural valvular abnormality demonstrated.  Imaging  · Chest x-ray: No new chest x-ray to review  · MRI brain 11/10/2019:  1. Areas of recent infarction involving the medial right parieto-occipital lobe at the calcarine cortex and right superior cerebellar hemisphere with subtle local mass effect but nothing to suggest hemorrhagic transformation. Findings are most consistent  with thromboembolic insults to the posterior circulation. See the separate, earlier CT angiogram neck vessels for description of posterior circulation disease. At this time there is no hydrocephalus. Follow-up imaging recommended.  2. Preexisting atrophy and mild probable sequelae of small vessel disease.  3. Partial redemonstration of paranasal sinus disease  · CT head/neck 11/10/2019:  1. There is severe disease at the origin of the dominant left vertebral artery over a short segment.  2. The right vertebral artery is severely diseased and provides only minimal supply the basilar system.  3. By NASCET criteria 0% stenosis at either carotid bifurcation. There is bilateral partially calcified plaque  #4 no central intracranial vascular cut off  5. Fetal origin to the left posterior cervical artery distribution.  6. Hypoplastic or diseased left A1 vessel is supplied of the left A2 vessel via the intercommunicating artery.  · CT cerebral perfusion 11/10/2019:  1. On noncontrast head CT, there is no evidence for acute intracranial hemorrhage. There is a vague area of low attenuation at the medial aspect of the right parieto-occipital lobe which could be an area of ischemia but it does not correspond to a  perfusion abnormality on the subsequent perfusion portion of  the study. It is best further characterized with a MRI if the patient is a candidate.  2. No ischemic penumbra is documented on the perfusion imaging     3. Paranasal sinus disease including a sinus air-fluid level right maxillary sinus  Lab Review   Results from last 7 days   Lab Units 11/11/19  0630   SODIUM mmol/L 138   POTASSIUM mmol/L 3.6   CHLORIDE mmol/L 102   CO2 mmol/L 24.0   BUN mg/dL 9   CREATININE mg/dL 0.77   GLUCOSE mg/dL 101*   CALCIUM mg/dL 8.2*     Results from last 7 days   Lab Units 11/11/19  0630   WBC 10*3/mm3 7.96   HEMOGLOBIN g/dL 14.3   HEMATOCRIT % 43.5   PLATELETS 10*3/mm3 190     Results from last 7 days   Lab Units 11/11/19  0630   CHOLESTEROL mg/dL 241*   TRIGLYCERIDES mg/dL 231*   HDL CHOL mg/dL 29*   LDL CHOL mg/dL 166*     Results from last 7 days   Lab Units 11/11/19  0630   HEMOGLOBIN A1C % 5.80*             Assessment:   Patient with embolic CVA in the setting of uncontrolled dyslipidemia and history of tachycardia versus subjective possible paroxysmal atrial fibrillation.  Patient will have a JAE tomorrow and will need a mobile cardiac outpatient telemetry monitor at discharge to assess for PAF, arrhythmias, or pauses.  The patient has type 2 diabetes mellitus with last hemoglobin A1c 5.8%.  He is followed by Dr. Hodge.  The patient had a run of nonsustained V. tach last night.  The patient may need a left heart catheterization before discharge.  We will try to obtain medical records from Dr. Hodge's office regarding his last stress test approximately 1 year ago.          Plan:   1.  Patient to have JAE tomorrow  2. MCOT at discharge  3.  Alcohol withdrawal protocol  4. ECG now and in morning  5. NPO after midnight except for meds  6. Coreg 6.25mg bid  7. Try to obtain medical records from Dr. Hodge    Scribed for Holly Edgar MD by Leela Granados, GIRISH. 11/11/2019  3:38 PM     I Holly Edgar MD personally performed the services described in this  documentation as scribed by the above individual in my presence, and it is both accurate and complete.    Holly Edgar MD, FACC

## 2019-11-11 NOTE — PROGRESS NOTES
Discharge Planning Assessment  [facility]     Patient Name: Oswaldo Boggs  MRN: [id number]  Today's Date: 11/11/2019    Admit Date: 11/10/2019    Discharge Needs Assessment     Row Name 11/11/19 1054       Living Environment    Lives With  spouse    Current Living Arrangements  home/apartment/condo    Primary Care Provided by  self    Provides Primary Care For  no one    Family Caregiver if Needed  spouse    Quality of Family Relationships  involved;supportive    Able to Return to Prior Arrangements  yes       Resource/Environmental Concerns    Resource/Environmental Concerns  none       Transition Planning    Patient/Family Anticipates Transition to  home with family    Patient/Family Anticipated Services at Transition  ;rehabilitation services    Transportation Anticipated  family or friend will provide       Discharge Needs Assessment    Readmission Within the Last 30 Days  no previous admission in last 30 days    Concerns to be Addressed  discharge planning    Equipment Currently Used at Home  oxygen nocturnal O2    Anticipated Changes Related to Illness  none        Discharge Plan     Row Name 11/11/19 1055       Plan    Plan  Home    Patient/Family in Agreement with Plan  yes    Plan Comments  Spoke with patient's wife in room to initiate discharge planning.  Patient speaking with other staff.  Patient lives in [facility] with his wife.  He is independent with ADL's.  He has nocturnal O2 at home, but they are unsure of the DME provider.  He has never had home health before.  Mr. Boggs has RX coverage and [facility]  His goal is to return home with his wife at discharge.  Will await therapy recommendations to determine proper discharge placement.  CM will continue to follow.  Jessica Nixon RN [phone]    Final Discharge Disposition Code  01 - home or self-care        Destination      No service coordination in this encounter.      Durable Medical Equipment      No  service coordination in this encounter.      Dialysis/Infusion      No service coordination in this encounter.      Home Medical Care      No service coordination in this encounter.      Therapy      No service coordination in this encounter.      Community Resources      No service coordination in this encounter.        Expected Discharge Date and Time     Expected Discharge Date Expected Discharge Time    Nov 14, 2019         Demographic Summary     Row Name 11/11/19 1053       General Information    Admission Type  inpatient    Arrived From  hospital    Referral Source  admission list    Reason for Consult  discharge planning    Preferred Language  English     Used During This Interaction  no    General Information Comments  PCP- Gillian Acuña        Functional Status     Row Name 11/11/19 1054       Functional Status    Usual Activity Tolerance  good    Current Activity Tolerance  moderate       Functional Status, IADL    Medications  independent    Meal Preparation  independent    Housekeeping  independent    Laundry  independent    Shopping  independent        Psychosocial    No documentation.       Abuse/Neglect    No documentation.       Legal     Row Name 11/11/19 1054       Financial/Legal    Finance Comments  Verified with patient's wife that he has [employer] MCR.  No issues obtaining medications.        Substance Abuse    No documentation.       Patient Forms    No documentation.           Jessica Nixon RN

## 2019-11-11 NOTE — PROGRESS NOTES
INTENSIVIST   PROGRESS NOTE     Hospital:  LOS: 1 day      S     Mr. Oswaldo Boggs, 74 y.o. male is followed for:      AIS s/p tPA    PAF (paroxysmal atrial fibrillation)     As an Intensivist, we provide an integrated approach to the ICU patient and family, medical management of comorbid conditions, lead interdisciplinary rounds and coordinate the care with all other services, including those from other specialists.     Interval History:  Doing well.  Minimal headaches.  No neuro deficits.     The patient's relevant past medical, surgical and social history were reviewed and updated in Epic as appropriate.     ROS:   Constitutional: Negative for fever.   Respiratory: Negative for dyspnea. Chronic Cough.   Cardiovascular: Negative for chest pain.   Gastrointestinal: Negative for  nausea, vomiting and diarrhea.        O     Vitals:  Temp: 97.9 °F (36.6 °C) (11/11/19 0800) Temp  Min: 97.9 °F (36.6 °C)  Max: 98 °F (36.7 °C)   Temp core:      BP: 146/88 (11/11/19 1300) BP  Min: 127/78  Max: 182/109   Pulse: 87 (11/11/19 1300) Pulse  Min: 59  Max: 97   Resp: 18 (11/11/19 1200) Resp  Min: 16  Max: 20   SpO2: 100 % (11/11/19 1300) SpO2  Min: 93 %  Max: 100 %   Device: room air (11/11/19 1200)    Flow Rate:   No Data Recorded     Intake/Ouptut 24 hrs (7:00AM - 6:59 AM)  Intake/Output       11/10/19 0700 - 11/11/19 0659 11/11/19 0700 - 11/12/19 0659    Intake (ml) 1306.2 670.2    Output (ml) 1475 --    Net (ml) -168.8 670.2    Last Weight  101 kg (223 lb 5.2 oz)  101 kg (222 lb 10.6 oz)         Physical Examination  Telemetry:  Rhythm: normal sinus rhythm (11/11/19 1200)         Constitutional:  No acute distress.   Cardiovascular: RRR.   Normal heart sounds.  No murmurs, gallop or rub.   Respiratory: Normal breath sounds  No adventitious sounds.   Abdominal:  Soft with no tenderness.  No distension.   No HSM.   Extremities: Warm.  Dry.  No cyanosis.  No Edema   Neurological:   Alert, Oriented, Cooperative.  Best Eye  Response: 4-->(E4) spontaneous (11/11/19 1300)  Best Motor Response: 6-->(M6) obeys commands (11/11/19 1300)  Best Verbal Response: 5-->(V5) oriented (11/11/19 1300)  [place] Coma Scale Score: 15 (11/11/19 1300)   Lines/Drains/Airways: Peripheral IV(s)     Hematology:  Results from last 7 days   Lab Units 11/11/19  0630   WBC 10*3/mm3 7.96   HEMOGLOBIN g/dL 14.3   MCV fL 98.9*   PLATELETS 10*3/mm3 190       Chemistry:  Estimated Creatinine Clearance: 96.5 mL/min (by C-G formula based on SCr of 0.77 mg/dL).    Results from last 7 days   Lab Units 11/11/19  0630   SODIUM mmol/L 138   POTASSIUM mmol/L 3.6   CHLORIDE mmol/L 102   CO2 mmol/L 24.0   ANION GAP mmol/L 12.0   GLUCOSE mg/dL 101*   CALCIUM mg/dL 8.2*     Results from last 7 days   Lab Units 11/11/19  0630   BUN mg/dL 9   CREATININE mg/dL 0.77           Hepatic:  Results from last 7 days   Lab Units 11/11/19  0630   ALK PHOS U/L 58   BILIRUBIN mg/dL 0.9   ALT (SGPT) U/L 11   AST (SGOT) U/L 21   ALBUMIN g/dL 3.40*     Images:  Ct Angiogram Head    Result Date: 11/10/2019  1. There is severe disease at the origin of the dominant left vertebral artery over a short segment. 2. The right vertebral artery is severely diseased and provides only minimal supply the basilar system. 3. By NASCET criteria 0% stenosis at either carotid bifurcation. There is bilateral partially calcified plaque #4 no central intracranial vascular cut off 5. Fetal origin to the left posterior cervical artery distribution. 6. Hypoplastic or diseased left A1 vessel is supplied of the left A2 vessel via the intercommunicating artery. Signer Name: Quyen Cortes MD  Signed: 11/10/2019 7:57 PM  Workstation Name: [id number]  [facility]    Mri Brain Without Contrast    Result Date: 11/10/2019  1. Areas of recent infarction involving the medial right parieto-occipital lobe at the calcarine cortex and right superior cerebellar hemisphere with subtle local mass effect but nothing  to suggest hemorrhagic transformation. Findings are most consistent with thromboembolic insults to the posterior circulation. See the separate, earlier CT angiogram neck vessels for description of posterior circulation disease. At this time there is no hydrocephalus. Follow-up imaging recommended. 2. Preexisting atrophy and mild probable sequelae of small vessel disease. 3. Partial redemonstration of paranasal sinus disease. NOTIFICATION: Critical Value/emergent results were called by telephone at the time of interpretation on 11/10/2019 10:54 PM to medical exchange. I have left my cell phone number for callback from Dr. Clement. I spoke to Dr. Clement at 10:57 PM Signer Name: Quyen Cortes MD  Signed: 11/10/2019 10:57 PM  Workstation Name: [facility]  Radiology [facility]    Ct Cerebral Perfusion With & Without Contrast    Result Date: 11/10/2019  1. On noncontrast head CT, there is no evidence for acute intracranial hemorrhage. There is a vague area of low attenuation at the medial aspect of the right parieto-occipital lobe which could be an area of ischemia but it does not correspond to a perfusion abnormality on the subsequent perfusion portion of the study. It is best further characterized with a MRI if the patient is a candidate. 2. No ischemic penumbra is documented on the perfusion imaging 3. Paranasal sinus disease including a sinus air-fluid level right maxillary sinus Signer Name: Quyen Cortes MD  Signed: 11/10/2019 7:42 PM  Workstation Name: [facility]  Radiology [facility]      Results: Reviewed.  I reviewed the patient's new laboratory and imaging results.  I independently reviewed the patient's new images.    Medications: Reviewed.    Assessment/Plan   A / P     Assessment:    74 y.o.male, admitted on 11/10/2019 with CVA (cerebral vascular accident) (CMS/[facility]) [I63.9]:     1. AIS s/p tPA  2. Cardiac:  1. P A Fib  2. HTN  3. Dyslipidemia   3. Bronchiectasis, on Chronic  Azythromycin.  4. Gout. He is only using Colchicine prn.  5. EtOH use    Results from last 7 days   Lab Units 11/11/19  0609 11/10/19  2334   GLUCOSE mg/dL 94 132*     Lab Results   Lab Value Date/Time    HGBA1C 5.80 (H) 11/11/2019 0630       Nutrition Support: Patient isn't on Tube Feeding   Modulars: Patient doesn't have any tube feeding modular orders   Diet: Diet Regular; Cardiac   Advance Directives: Code Status and Medical Interventions:   Ordered at: 11/10/19 1833     Level Of Support Discussed With:    Patient     Code Status:    CPR     Medical Interventions (Level of Support Prior to Arrest):    Full        Plan:    1. 24 h CT Head follow up post tPA, today at 18:00  2. Now c/o gout attack: Colchicine.   3. Folic acid and B1 supplements.  4. Disposition: Keep in ICU.    Plan of care and goals reviewed during interdisciplinary rounds.  I discussed the patient's findings and my recommendations with patient and nursing staff    Level of Risk is High due to:  illness with threat to life or bodily function.     Time: 25 minutes, in direct patient care, with the family and/or on the comer coordinating care with other health care providers.     I have spent > 50% percent of this time, counseling and discussing management.       Mejia Jacobs MD, FACP, FCCP, CNSC  Intensive Care Medicine, Nutrition Support and Pulmonary Medicine     [x]  Primary Attending  []  Consultant

## 2019-11-11 NOTE — PLAN OF CARE
Problem: Patient Care Overview  Goal: Plan of Care Review  Outcome: Ongoing (interventions implemented as appropriate)   11/11/19 1330   Coping/Psychosocial   Plan of Care Reviewed With patient   OTHER   Outcome Summary PT initial evaluation completed for pt s/p AIS presenting with generalized weakness, mild balance deficits, and decreased functional mobility. Pt ambulated 80ft with CGA and B UE support. Pt's decreased independence warrants PT skilled care. Recommend D/C home with assistance.        Problem: Stroke (Ischemic) (Adult)  Goal: Signs and Symptoms of Listed Potential Problems Will be Absent, Minimized or Managed (Stroke)  Outcome: Ongoing (interventions implemented as appropriate)   11/11/19 1330   Goal/Outcome Evaluation   Problems Assessed (Stroke (Ischemic)) cognitive impairment;communication impairment;motor/sensory impairment   Problems Assessed (Stroke (Ischemic)) motor/sensory impairment

## 2019-11-11 NOTE — PLAN OF CARE
Problem: Patient Care Overview  Goal: Plan of Care Review  Outcome: Ongoing (interventions implemented as appropriate)   11/11/19 0902   Coping/Psychosocial   Plan of Care Reviewed With patient;family   Plan of Care Review   Progress improving   OTHER   Outcome Summary OT completed a brief chart review. Pt CGA for mobility and independent w/ ADLs, limited d/t noted slight dysarthria. Pt has no further deficits which require IPOT services, will sign off and defer further mobility to PT. Recommend pt DC home w/ assist.        Problem: Stroke (Ischemic) (Adult)  Goal: Signs and Symptoms of Listed Potential Problems Will be Absent, Minimized or Managed (Stroke)  Outcome: Ongoing (interventions implemented as appropriate)   11/11/19 0902   Goal/Outcome Evaluation   Problems Assessed (Stroke (Ischemic)) cognitive impairment;communication impairment;motor/sensory impairment   Problems Assessed (Stroke (Ischemic)) communication impairment

## 2019-11-11 NOTE — CONSULTS
Order noted for diabetes education per stroke protocol. No noted history of diabetes per chart review. A1c 5.8%. Please re consult if needed. Thank you.

## 2019-11-11 NOTE — THERAPY DISCHARGE NOTE
Acute Care - Occupational Therapy Initial Eval/Discharge  [facility]     Patient Name: Oswaldo Boggs  : 1945  MRN: [id number]  Today's Date: 2019     Date of Referral to OT: 11/10/19         Admit Date: 11/10/2019     No diagnosis found.  Patient Active Problem List   Diagnosis   • Bronchiectasis (CMS/HCC)   • Dyslipidemia   • PPD positive   • AIS s/p tPA   • PAF (paroxysmal atrial fibrillation) (CMS/HCC)   • Hypertension   • Alcohol abuse     Past Medical History:   Diagnosis Date   • Glaucoma    • History of positive PPD, untreated    • Hypertension    • Nephrolithiasis    • PAF (paroxysmal atrial fibrillation) (CMS/HCC)    • Personal history of renal calculi     x1, passed spontaneously      Past Surgical History:   Procedure Laterality Date   • CATARACT EXTRACTION     • COLONOSCOPY     • INGUINAL HERNIA REPAIR      x2   • UMBILICAL HERNIA REPAIR            OT ASSESSMENT FLOWSHEET (last 12 hours)      Occupational Therapy Evaluation     Row Name 19 0902                   OT Evaluation Time/Intention    Subjective Information  no complaints  -CL        Document Type  discharge evaluation/summary  -CL        Mode of Treatment  occupational therapy  -CL        Patient Effort  good  -CL        Symptoms Noted During/After Treatment  none  -CL           General Information    Patient Profile Reviewed?  yes  -CL        Prior Level of Function  independent:;all household mobility;transfer;ADL's;dressing;bathing;driving  -CL        Equipment Currently Used at Home  none  -CL        Existing Precautions/Restrictions  fall  -CL        Risks Reviewed  patient and family:;LOB;nausea/vomiting;dizziness;change in vital signs;increased discomfort;lines disloged  -CL        Benefits Reviewed  patient and family:;improve function;increase independence;increase strength;increase balance;decrease pain;increase knowledge  -CL        Barriers to Rehab  none identified  -CL           Relationship/Environment     Lives With  spouse  -CL           Resource/Environmental Concerns    Current Living Arrangements  home/apartment/condo  -CL           Cognitive Assessment/Interventions    Additional Documentation  Cognitive Assessment/Intervention (Group)  -CL           Cognitive Assessment/Intervention- PT/OT    Affect/Mental Status (Cognitive)  WFL  -CL        Orientation Status (Cognition)  oriented x 3  -CL        Follows Commands (Cognition)  WFL  -CL        Cognitive Function (Cognitive)  WFL  -CL           Bed Mobility Assessment/Treatment    Bed Mobility Assessment/Treatment  supine-sit  -CL        Supine-Sit [place] (Bed Mobility)  conditional independence  -CL        Assistive Device (Bed Mobility)  bed rails;head of bed elevated  -CL           Functional Mobility    Functional Mobility- Ind. Level  contact guard assist  -CL        Functional Mobility-Distance (Feet)  200  -CL           Transfer Assessment/Treatment    Transfer Assessment/Treatment  sit-stand transfer;stand-sit transfer;toilet transfer  -CL           Sit-Stand Transfer    Sit-Stand [place] (Transfers)  independent  -CL           Stand-Sit Transfer    Stand-Sit [place] (Transfers)  independent  -CL           Toilet Transfer    Type (Toilet Transfer)  sit-stand;stand-sit  -CL        [place] Level (Toilet Transfer)  independent  -CL           ADL Assessment/Intervention    BADL Assessment/Intervention  toileting  -CL           Toileting Assessment/Training    [place] Level (Toileting)  adjust/manage clothing;perform perineal hygiene;independent  -CL        Toileting Position  unsupported sitting;unsupported standing  -CL           General ROM    GENERAL ROM COMMENTS  BUE grossly WFL.   -CL           MMT (Manual Muscle Testing)    General MMT Comments  BUE grossly 4/5.   -CL           Motor Assessment/Interventions    Additional Documentation  Balance (Group);Therapeutic Exercise (Group);Gross Motor Coordination (Group)  -CL            Gross Motor Coordination    Gross Motor Impairments  finger to nose  -CL        Gross Motor Skill, Impairments Detail  BUE WFL.   -CL           Balance    Balance  static sitting balance;static standing balance  -CL           Static Sitting Balance    Level of [place] (Unsupported Sitting, Static Balance)  independent  -CL        Sitting Position (Unsupported Sitting, Static Balance)  sitting on edge of bed  -CL           Static Standing Balance    Level of [place] (Supported Standing, Static Balance)  independent  -CL           Sensory Assessment/Intervention    Sensory General Assessment  no sensation deficits identified  -CL        Additional Documentation  Vision Assessment/Intervention (Group)  -CL           Vision Assessment/Intervention    Visual Impairment/Limitations  WFL  -CL           Positioning and Restraints    Pre-Treatment Position  in bed  -CL        Post Treatment Position  chair  -CL        In Chair  notified nsg;reclined;call light within reach;encouraged to call for assist;exit alarm on;with family/caregiver;with nsg;legs elevated  -CL           Pain Assessment    Additional Documentation  Pain Scale: Numbers Pre/Post-Treatment (Group)  -CL           Pain Scale: Numbers Pre/Post-Treatment    Pain Scale: Numbers, Pretreatment  0/10 - no pain  -CL        Pain Scale: Numbers, Post-Treatment  0/10 - no pain  -CL           Clinical Impression (OT)    Date of Referral to OT  11/10/19  -CL        OT Diagnosis  Pt at baseline ADL performance.   -CL        Patient/Family Goals Statement (OT Eval)  Return home.   -CL        Criteria for Skilled Therapeutic Interventions Met (OT Eval)  no;no problems identified which require skilled intervention  -CL        Anticipated Discharge Disposition (OT)  home with assist  -CL           Vital Signs    Pre Systolic BP Rehab  153  -CL        Pre Treatment Diastolic BP  101  -CL        Post Systolic BP Rehab  -- w/ RN  -CL        Pretreatment Heart Rate  (beats/min)  73  -CL        Posttreatment Heart Rate (beats/min)  80  -CL        Pre SpO2 (%)  97  -CL        O2 Delivery Pre Treatment  room air  -CL        Post SpO2 (%)  96  -CL        O2 Delivery Post Treatment  room air  -CL        Pre Patient Position  Supine  -CL        Intra Patient Position  Standing  -CL        Post Patient Position  Sitting  -CL           Living Environment    Home Accessibility  -- can avoid all stairs  -CL          User Key  (r) = Recorded By, (t) = Taken By, (c) = Cosigned By    Initials Name Effective Dates    CL Jennifer Payan OT 04/03/18 -           Occupational Therapy Education     Title: PT OT SLP Therapies (In Progress)     Topic: Occupational Therapy (In Progress)     Point: ADL training (Done)     Description: Instruct learner(s) on proper safety adaptation and remediation techniques during self care or transfers.   Instruct in proper use of assistive devices.    Learning Progress Summary           Patient Acceptance, E, VU by CL at 11/11/2019  9:02 AM   Family Acceptance, E, VU by CL at 11/11/2019  9:02 AM                   Point: Precautions (Done)     Description: Instruct learner(s) on prescribed precautions during self-care and functional transfers.    Learning Progress Summary           Patient Acceptance, E, VU by CL at 11/11/2019  9:02 AM   Family Acceptance, E, VU by CL at 11/11/2019  9:02 AM                   Point: Body mechanics (Done)     Description: Instruct learner(s) on proper positioning and spine alignment during self-care, functional mobility activities and/or exercises.    Learning Progress Summary           Patient Acceptance, E, VU by CL at 11/11/2019  9:02 AM   Family Acceptance, E, VU by CL at 11/11/2019  9:02 AM                               User Key     Initials Effective Dates Name Provider Type Discipline    CL 04/03/18 -  Jennifer Payan OT Occupational Therapist OT                OT Recommendation and Plan  Outcome Summary/Treatment Plan  (OT)  Anticipated Discharge Disposition (OT): home with assist  Plan of Care Review  Plan of Care Reviewed With: patient, family  Plan of Care Reviewed With: patient, family  Outcome Summary: OT completed a brief chart review. Pt CGA for mobility and independent w/ ADLs, limited d/t noted slight dysarthria. Pt has no further deficits which require IPOT services, will sign off and defer further mobility to PT. Recommend pt DC home w/ assist.          Outcome Measures     Row Name 11/11/19 0902             How much help from another is currently needed...    Putting on and taking off regular lower body clothing?  4  -CL      Bathing (including washing, rinsing, and drying)  3  -CL      Toileting (which includes using toilet bed pan or urinal)  4  -CL      Putting on and taking off regular upper body clothing  4  -CL      Taking care of personal grooming (such as brushing teeth)  4  -CL      Eating meals  4  -CL      AM-PAC 6 Clicks Score (OT)  23  -CL         Modified [place] Scale    Pre-Stroke Modified [place] Scale  0 - No Symptoms at all.  -CL      Modified [place] Scale  1 - No significant disability despite symptoms.  Able to carry out all usual duties and activities.  -CL         Functional Assessment    Outcome Measure Options  AM-PAC 6 Clicks Daily Activity (OT);Modified [place]  -CL        User Key  (r) = Recorded By, (t) = Taken By, (c) = Cosigned By    Initials Name Provider Type    Jennifer Lynch OT Occupational Therapist          Time Calculation:   Time Calculation- OT     Row Name 11/11/19 0902             Time Calculation- OT    OT Start Time  0902  -CL      OT Received On  11/11/19  -      OT Goal Re-Cert Due Date  11/21/19  -CL        User Key  (r) = Recorded By, (t) = Taken By, (c) = Cosigned By    Initials Name Provider Type    Jennifer Lynch OT Occupational Therapist        Therapy Suggested Charges     Code   Minutes Charges    None           Therapy Charges for Today     Code Description  Service Date Service Provider Modifiers Qty    69102070409 HC OT EVAL LOW COMPLEXITY 4 11/11/2019 Jennifer Payan, DAYNA GO 1               OT Discharge Summary  Anticipated Discharge Disposition (OT): home with assist  Reason for Discharge: At baseline function  Outcomes Achieved: Refer to plan of care for updates on goals achieved  Discharge Destination: Home with assist    Jennifer Payan OT  11/11/2019

## 2019-11-11 NOTE — PLAN OF CARE
Problem: Patient Care Overview  Goal: Plan of Care Review  Outcome: Ongoing (interventions implemented as appropriate)   11/11/19 7012   Plan of Care Review   Progress improving   OTHER   Outcome Summary No neuro changes throughout shift. Speech clear and back to baseline. VSS. UOP adequate. Current NIH 0. Will continue to monitor.       Problem: Stroke (Ischemic) (Adult)  Goal: Signs and Symptoms of Listed Potential Problems Will be Absent, Minimized or Managed (Stroke)  Outcome: Ongoing (interventions implemented as appropriate)

## 2019-11-11 NOTE — PROGRESS NOTES
"Neurology       Patient Care Team:  Gillian Acuña APRN as PCP - General (Family Medicine)    Chief complaint: Dizziness    History: Patient says symptoms of largely resolved.    He tells me that he had paroxysmal A. fib in the past diagnosed by Dr. Hodge after getting a dose of medication for his respirations.    He is never smoked.    He is been a heavy drinker.      Past Medical History:   Diagnosis Date   • Glaucoma    • History of positive PPD, untreated    • Hypertension    • Nephrolithiasis    • PAF (paroxysmal atrial fibrillation) (CMS/[facility])    • Personal history of renal calculi     x1, passed spontaneously        Vital Signs   Vitals:    11/11/19 1100 11/11/19 1149 11/11/19 1200 11/11/19 1300   BP: 142/86  160/91 146/88   Pulse: 78  81 87   Resp:   18    Temp:       TempSrc:       SpO2: 96%  95% 100%   Weight:  101 kg (222 lb 10.6 oz)     Height:  177.8 cm (70\")         Physical Exam:   General: Awake and alert              Neuro: Oriented to person place and time.    Speech is articulate.    Coordination is slightly slower on fine finger movements on the right than the left.  He has mild dysmetria with finger-to-nose testing on the right.    Cranial nerves show benign fundi equal pupils visual fields are diminished in the lateral half of the left eye.  Fields are full.    Facial movement and sensation are palate elevates normally tongue protrudes normally.    Reflexes 1+ and equal bilaterally.    Motor testing shows symmetrical strength and tone in all muscle groups.    Sensory testing is normal.        Results Review:  MRI shows acute strokes in the right occipital and right cerebellar hemisphere.    CT angiogram shows severe disease of the origin of the dominant left vertebral over a short segment in the right vertebral shows also shows severe disease.  Results from last 7 days   Lab Units 11/11/19  0630   WBC 10*3/mm3 7.96   HEMOGLOBIN g/dL 14.3   HEMATOCRIT % 43.5   PLATELETS 10*3/mm3 190 "     Results from last 7 days   Lab Units 11/11/19  0630   SODIUM mmol/L 138   POTASSIUM mmol/L 3.6   CHLORIDE mmol/L 102   CO2 mmol/L 24.0   BUN mg/dL 9   CREATININE mg/dL 0.77   CALCIUM mg/dL 8.2*   BILIRUBIN mg/dL 0.9   ALK PHOS U/L 58   ALT (SGPT) U/L 11   AST (SGOT) U/L 21   GLUCOSE mg/dL 101*       Imaging Results (Last 24 Hours)     Procedure Component Value Units Date/Time    MRI Brain Without Contrast [[id number]] Collected:  11/10/19 2257     Updated:  11/10/19 2259    Narrative:       INDICATION:    Stroke. Dizziness and slurred speech onset 1500 today.    TECHNIQUE:   MRI of the brain without contrast.    COMPARISON:    Earlier CT perfusion CTA head CTA neck and noncontrast head CT obtained for the perfusion.    FINDINGS:  There is a focus of abnormally restricted diffusion involving the medial right parieto-occipital lobe approximately at the calcarine cortex consistent with a recent infarct in the right posterior cerebral artery distribution. It measures about 2.6 x 1.2  cm axial plane. There is mild local mass effect. There is nothing on MRI to suggest hemorrhagic transformation. There is a second focus of restricted diffusion consistent with a recent infarct at the posterior superior peripheral right cerebellar  hemisphere about 2.4 x 1.7 cm axial plane. Again there is subtle local mass effect but nothing to suggest hemorrhagic transformation on the MRI. There is a larger area of T2 and FLAIR signal abnormality in the right superior cerebellar hemisphere than  the restricted diffusion. This could be due to less severe ischemia for slightly different age of the area of infarction. Follow-up imaging recommended to reassess extent of the cerebellar infarct.    There is generalized atrophy. There is no Chiari I malformation. Right vertebral artery flow void is abnormal consistent with the findings on the CT angiogram head and neck. Please refer to that report. Otherwise the major arterial intracranial  flow  voids are maintained. The mastoid air cells are essentially clear. There is paranasal sinus disease with air-fluid levels consistent with a component of acute sinusitis. No extra-axial fluid collection is suspected. No hydrocephalus. Post cataract  surgery bilaterally. Basilar cisterns patent. There is mild white matter disease otherwise, likely due to small vessel disease.      Impression:         1. Areas of recent infarction involving the medial right parieto-occipital lobe at the calcarine cortex and right superior cerebellar hemisphere with subtle local mass effect but nothing to suggest hemorrhagic transformation. Findings are most consistent  with thromboembolic insults to the posterior circulation. See the separate, earlier CT angiogram neck vessels for description of posterior circulation disease. At this time there is no hydrocephalus. Follow-up imaging recommended.  2. Preexisting atrophy and mild probable sequelae of small vessel disease.  3. Partial redemonstration of paranasal sinus disease.    NOTIFICATION: Critical Value/emergent results were called by telephone at the time of interpretation on 11/10/2019 10:54 PM to medical exchange. I have left my cell phone number for callback from Dr. Clement. I spoke to Dr. Clement at 10:57 PM    Signer Name: Quyen Cortes MD   Signed: 11/10/2019 10:57 PM   Workstation Name: [facility]    [facility]    CT Angiogram Neck [[id number]] Collected:  11/10/19 1959     Updated:  11/10/19 2001    Narrative:       CT angiography neck: Please refer to the CT angiogram head report for complete description of the head and neck findings.    On the additional images provided there may be a tiny right posterior communicating artery present.    Signer Name: Quyen Cortes MD   Signed: 11/10/2019 7:59 PM   [email or web address]    [facility]    CT Angiogram Head [[id number]] Collected:  11/10/19 1957     Updated:   11/10/19 1959    Narrative:       CTA Head    INDICATION:   Dizziness and slurred speech. Less known well around 1500 today. Had TPA at an outside facility prior to transfer    TECHNIQUE:   CT angiogram of the head and neck with during the intravenous administration of nonionic contrast media. Dose recorded in patient's chart. IV contrast. 3-D reconstructions were obtained and reviewed.  Evaluation for a significant carotid arterial  stenosis is based on the NASCET criteria. Radiation dose reduction techniques included automated exposure control or exposure modulation based on body size. Count of known CT and cardiac nuc med studies performed in previous 12 months: 0. Noncontrast  imaging of the head and neck also obtained    COMPARISON:   CT perfusion    FINDINGS:   CTA neck:  There are mild vascular calcifications aortic arch and great vessel origins. There is bovine origin left common carotid artery. There is no hemodynamically significant stenosis of great vessel origins from the arch.    Right carotid system: There is partially calcified plaque at the right carotid bifurcation. By NASCET criteria there is 0% diameter stenosis. There is artifact from patient's dental metal. There is disease at the right carotid siphon with mild to  moderate stenosis.    Left carotid system. Is partially calcified plaque at the left carotid bifurcation. By NASCET criteria, allowing for dental metal artifact there is essentially 0% diameter stenosis. There is partly calcified plaque at the siphon also with mild stenosis  likely.    There is severe stenosis at the origin of the left vertebral artery. This is the dominant vertebral vessel. The stenosis is seen over a fairly short segment. The right vertebral artery occludes either at its origin or just distal to its origin and there  is reconstitution of a tiny diseased vessel in the neck.    CTA head:  There is diseased right vertebral artery intracranially with atherosclerotic  vascular calcification. Majority of supply to the basilar comes from the left vertebral artery. As noted above there is calcified disease at both carotid siphons. The  left P1 vessel is either aplastic or severely hypoplastic or diseased. There is a large anterior communicating artery and a hypoplastic or diseased left A1 vessel. The majority of the supply to the left A2 vessels from the right side. Otherwise no focal  central stenosis is suspected. There is fetal origin to the left posterior cerebral artery distribution. There is no significant sized right posterior communicating artery appreciated. No central intracranial vascular cut off is appreciated.    There is been cataract surgery bilaterally. Partly redemonstrated is paranasal sinus disease with air-fluid levels in the right sphenoid sinus right maxillary sinus. There are degenerative changes in the spine. This is a limited study with reformats  performed for stroke evaluation. Small aneurysms would not be appreciated. The dural venous sinuses are grossly patent.      Impression:         1. There is severe disease at the origin of the dominant left vertebral artery over a short segment.  2. The right vertebral artery is severely diseased and provides only minimal supply the basilar system.  3. By NASCET criteria 0% stenosis at either carotid bifurcation. There is bilateral partially calcified plaque  #4 no central intracranial vascular cut off  5. Fetal origin to the left posterior cervical artery distribution.  6. Hypoplastic or diseased left A1 vessel is supplied of the left A2 vessel via the intercommunicating artery.    Signer Name: Quyen Cortes MD   Signed: 11/10/2019 7:57 PM   Workstation Name: [email or web address]    [facility] [place]    CT Cerebral Perfusion With & Without Contrast [[id number]] Collected:  11/10/19 1942     Updated:  11/10/19 1944    Narrative:       CT CEREBRAL PERFUSION W WO CONTRAST noncontrast head CT    HISTORY:    Dizziness and slurred speech. Last known well around 1500 today. TPA at a outside facility with transfer    TECHNIQUE:   Axial CT images of the brain without and with intravenous contrast using cerebral perfusion protocol. Post-processing parametric maps were created using RAPID software and reviewed. Radiation dose reduction techniques included automated exposure control  or exposure modulation based on body size. CT and nuclear cardiology exams in the last 12 months: 0. Imaging obtained during the intravenous administration of nonionic contrast media with a dose recorded in the patient's chart. Preperfusion noncontrast  head CT also obtained. COMPARISON:   None available    FINDINGS:    Noncontrast head CT:    There is no evidence for acute intracranial hemorrhage. There is ill-defined low-attenuation at the medial aspect of the right parieto-occipital lobe measuring about 2 cm in largest dimension. This could reflect age-indeterminate ischemic insult. Is best  pursued with follow-up MRI if the patient is a candidate. There is no extra-axial fluid collection or intracranial mass affect. There is mild generalized volume loss is mild white matter low-attenuation is nonspecific but likely due to small vessel  disease. There is partial opacification the ethmoid air cells and there is mucosal disease within the hypoplastic right maxillary sinus with small air-fluid level. There are vascular calcifications of the base of the brain.    CT PERFUSION:  Arterial input function is optimal.     No significant ischemic penumbra documented. Perfusion is essentially within the range of normal. Tmax greater than 4 seconds is documented 25 mL's of the deep white matter bilateral cerebral hemispheres.      Impression:         1. On noncontrast head CT, there is no evidence for acute intracranial hemorrhage. There is a vague area of low attenuation at the medial aspect of the right parieto-occipital lobe which could be an area of  ischemia but it does not correspond to a  perfusion abnormality on the subsequent perfusion portion of the study. It is best further characterized with a MRI if the patient is a candidate.  2. No ischemic penumbra is documented on the perfusion imaging    3. Paranasal sinus disease including a sinus air-fluid level right maxillary sinus        Signer Name: Quyen Cortes MD   Signed: 11/10/2019 7:42 PM   [email or web address]    [facility]          Assessment:  Multiple acute embolic strokes.    Asymptomatic severe intracranial atherosclerosis    Plan:  Neurology consult.    JAE tomorrow.    Past medical therapy for atherosclerosis.        Comment:  The question is whether this is related to paroxysmal atrial fib or some other etiology such as atheroma in the a sending aorta         I discussed the patients findings and my recommendations with patient and nursing staff    Binu Hendrix MD  11/11/19  2:52 PM

## 2019-11-11 NOTE — PLAN OF CARE
Problem: Patient Care Overview  Goal: Plan of Care Review  Outcome: Ongoing (interventions implemented as appropriate)   11/11/19 1609   Coping/Psychosocial   Plan of Care Reviewed With spouse;patient   SLP evaluation completed. 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 Pt may benefit from cont'd SLP services @ next level of care. Please see note for further details and recommendations.      Problem: Stroke (Ischemic) (Adult)  Goal: Signs and Symptoms of Listed Potential Problems Will be Absent, Minimized or Managed (Stroke)  Outcome: Ongoing (interventions implemented as appropriate)   11/11/19 1609   Goal/Outcome Evaluation   Problems Assessed (Stroke (Ischemic)) cognitive impairment;communication impairment   Problems Assessed (Stroke (Ischemic)) cognitive impairment;communication impairment

## 2019-11-11 NOTE — THERAPY EVALUATION
Patient Name: Oswaldo Boggs  : 1945    MRN: [id number]                              Today's Date: 2019       Admit Date: 11/10/2019    Visit Dx:     ICD-10-CM ICD-9-CM   1. Cerebrovascular accident (CVA) due to embolism of cerebral artery (CMS/[facility]) I63.40 434.11   2. Cognitive communication deficit R41.841 799.52     Patient Active Problem List   Diagnosis   • Bronchiectasis (CMS/HCC)   • Dyslipidemia   • PPD positive   • AIS s/p tPA   • PAF (paroxysmal atrial fibrillation) (CMS/HCC)   • Hypertension   • Alcohol abuse     Past Medical History:   Diagnosis Date   • Glaucoma    • History of positive PPD, untreated    • Hypertension    • Nephrolithiasis    • PAF (paroxysmal atrial fibrillation) (CMS/[facility])    • Personal history of renal calculi     x1, passed spontaneously      Past Surgical History:   Procedure Laterality Date   • CATARACT EXTRACTION     • COLONOSCOPY     • INGUINAL HERNIA REPAIR      x2   • UMBILICAL HERNIA REPAIR       General Information     Row Name 19 1558          PT Evaluation Time/Intention    Document Type  evaluation  -KR     Mode of Treatment  physical therapy  -KR     Row Name 19 1558          General Information    Patient Profile Reviewed?  yes  -KR     Prior Level of Function  independent:;all household mobility;gait;transfer;ADL's;dressing;bathing  -KR     Existing Precautions/Restrictions  fall  -KR     Barriers to Rehab  none identified  -KR     Row Name 19 1558          Relationship/Environment    Lives With  spouse  -KR     Row Name 19 1558          Resource/Environmental Concerns    Current Living Arrangements  home/apartment/condo  -KR     Row Name 19 1605          Home Main Entrance    Number of Stairs, Main Entrance  none  -KR     Row Name 19 1605          Stairs Within Home, Primary    Number of Stairs, Within Home, Primary  none  -KR     Row Name 19 1552          Cognitive Assessment/Intervention- PT/OT    Orientation  Status (Cognition)  oriented x 3  -KR     Row Name 11/11/19 1558          Safety Issues, Functional Mobility    Safety Issues Affecting Function (Mobility)  awareness of need for assistance;insight into deficits/self awareness;safety precaution awareness;safety precautions follow-through/compliance  -KR     Impairments Affecting Function (Mobility)  balance;coordination;endurance/activity tolerance;strength  -KR       User Key  (r) = Recorded By, (t) = Taken By, (c) = Cosigned By    Initials Name Provider Type    KR Deann Mendez, PT Physical Therapist        Mobility     Row Name 11/11/19 1559          Bed Mobility Assessment/Treatment    Bed Mobility Assessment/Treatment  supine-sit  -KR     Supine-Sit [place] (Bed Mobility)  supervision  -KR     Assistive Device (Bed Mobility)  bed rails;head of bed elevated  -KR     Row Name 11/11/19 1559          Transfer Assessment/Treatment    Comment (Transfers)  VC's for sequencing. Toilet transfer in bathroom with CGA.   -KR     Row Name 11/11/19 1559          Sit-Stand Transfer    Sit-Stand [place] (Transfers)  stand by assist;verbal cues  -KR     Row Name 11/11/19 1559          Gait/Stairs Assessment/Training    Gait/Stairs Assessment/Training  gait/ambulation independence  -KR     [place] Level (Gait)  contact guard;verbal cues  -KR     Assistive Device (Gait)  other (see comments) B UE support  -KR     Distance in Feet (Gait)  80  -KR     Pattern (Gait)  step-through  -KR     Deviations/Abnormal Patterns (Gait)  left sided deviations;base of support, narrow;marnie decreased;stride length decreased  -KR     Bilateral Gait Deviations  forward flexed posture;heel strike decreased  -KR     Left Sided Gait Deviations  weight shift ability decreased  -KR     Comment (Gait/Stairs)  Pt demonstrated step through gait pattern with slow marnie and decreased step length. Pt demonstrated decreased weight bearing on L. Pt with periods of decreased balance and  coordination requiring increased assistance. Mobility limited by c/o increased L foot pain.   -KR       User Key  (r) = Recorded By, (t) = Taken By, (c) = Cosigned By    Initials Name Provider Type    Deann Ponce PT Physical Therapist        Obj/Interventions     Row Name 11/11/19 1601          General ROM    GENERAL ROM COMMENTS  BLE WFL   -KR     Row Name 11/11/19 1601          MMT (Manual Muscle Testing)    General MMT Comments  BLE grossly 3+/5  -KR     Row Name 11/11/19 1601          Static Sitting Balance    Level of [place] (Unsupported Sitting, Static Balance)  supervision  -KR     Sitting Position (Unsupported Sitting, Static Balance)  sitting on edge of bed  -KR     Row Name 11/11/19 1601          Static Standing Balance    Level of [place] (Supported Standing, Static Balance)  standby assist  -KR     Assistive Device Utilized (Supported Standing, Static Balance)  other (see comments) UE support  -KR     Row Name 11/11/19 1601          Dynamic Standing Balance    Level of [place], Reaches Outside Midline (Standing, Dynamic Balance)  contact guard assist  -KR     Assistive Device Utilized (Supported Standing, Dynamic Balance)  other (see comments) UE support  -KR     Row Name 11/11/19 1601          Sensory Assessment/Intervention    Sensory General Assessment  no sensation deficits identified  -KR       User Key  (r) = Recorded By, (t) = Taken By, (c) = Cosigned By    Initials Name Provider Type    Deann Ponce PT Physical Therapist        Goals/Plan     Row Name 11/11/19 1604          Bed Mobility Goal 1 (PT)    Activity/Assistive Device (Bed Mobility Goal 1, PT)  bed mobility activities, all  -KR     [place] Level/Cues Needed (Bed Mobility Goal 1, PT)  independent  -KR     Time Frame (Bed Mobility Goal 1, PT)  2 weeks  -KR     Progress/Outcomes (Bed Mobility Goal 1, PT)  goal ongoing  -KR     Row Name 11/11/19 1604          Transfer Goal 1 (PT)    Activity/Assistive  Device (Transfer Goal 1, PT)  sit-to-stand/stand-to-sit;bed-to-chair/chair-to-bed  -KR     [place] Level/Cues Needed (Transfer Goal 1, PT)  independent  -KR     Time Frame (Transfer Goal 1, PT)  2 weeks  -KR     Progress/Outcome (Transfer Goal 1, PT)  goal ongoing  -KR     Row Name 11/11/19 1604          Gait Training Goal 1 (PT)    Activity/Assistive Device (Gait Training Goal 1, PT)  gait (walking locomotion)  -KR     [place] Level (Gait Training Goal 1, PT)  independent  -KR     Distance (Gait Goal 1, PT)  400 feet  -KR     Time Frame (Gait Training Goal 1, PT)  2 weeks  -KR     Progress/Outcome (Gait Training Goal 1, PT)  goal ongoing  -KR       User Key  (r) = Recorded By, (t) = Taken By, (c) = Cosigned By    Initials Name Provider Type    Deann Ponce, PT Physical Therapist        Clinical Impression     Row Name 11/11/19 1601          Pain Assessment    Additional Documentation  Pain Scale: Numbers Pre/Post-Treatment (Group)  -KR     Row Name 11/11/19 1601          Pain Scale: Numbers Pre/Post-Treatment    Pain Scale: Numbers, Pretreatment  7/10  -KR     Pain Scale: Numbers, Post-Treatment  7/10  -KR     Pain Location - Side  Left  -KR     Pain Location  foot  -KR     Pain Intervention(s)  Repositioned;Ambulation/increased activity  -KR     Row Name 11/11/19 1601          Plan of Care Review    Plan of Care Reviewed With  patient  -KR     Row Name 11/11/19 1601          Physical Therapy Clinical Impression    Patient/Family Goals Statement (PT Clinical Impression)  return to PLOF  -KR     Criteria for Skilled Interventions Met (PT Clinical Impression)  yes;treatment indicated  -KR     Rehab Potential (PT Clinical Summary)  good, to achieve stated therapy goals  -KR     Row Name 11/11/19 1601          Vital Signs    Pre Systolic BP Rehab  146  -KR     Pre Treatment Diastolic BP  88  -KR     Post Systolic BP Rehab  154  -KR     Post Treatment Diastolic BP  91  -KR     Pretreatment Heart Rate  (beats/min)  103  -KR     Posttreatment Heart Rate (beats/min)  105  -KR     Pre SpO2 (%)  95  -KR     O2 Delivery Pre Treatment  room air  -KR     Post SpO2 (%)  95  -KR     O2 Delivery Post Treatment  room air  -KR     Pre Patient Position  Supine  -KR     Intra Patient Position  Standing  -KR     Post Patient Position  Sitting  -KR     Row Name 11/11/19 1601          Positioning and Restraints    Pre-Treatment Position  in bed  -KR     Post Treatment Position  chair  -KR     In Chair  notified nsg;call light within reach;encouraged to call for assist;legs elevated  -KR       User Key  (r) = Recorded By, (t) = Taken By, (c) = Cosigned By    Initials Name Provider Type    Deann Ponce PT Physical Therapist        Outcome Measures     Row Name 11/11/19 1609          How much help from another person do you currently need...    Turning from your back to your side while in flat bed without using bedrails?  4  -KR     Moving from lying on back to sitting on the side of a flat bed without bedrails?  3  -KR     Moving to and from a bed to a chair (including a wheelchair)?  3  -KR     Standing up from a chair using your arms (e.g., wheelchair, bedside chair)?  3  -KR     Climbing 3-5 steps with a railing?  2  -KR     To walk in hospital room?  3  -KR     AM-PAC 6 Clicks Score (PT)  18  -KR     Row Name 11/11/19 1609          Modified [place] Scale    Pre-Stroke Modified Jus Scale  0 - No Symptoms at all.  -KR     Modified [place] Scale  1 - No significant disability despite symptoms.  Able to carry out all usual duties and activities.  -KR     Row Name 11/11/19 1609          Functional Assessment    Outcome Measure Options  AM-PAC 6 Clicks Basic Mobility (PT);Modified [place]  -KR       User Key  (r) = Recorded By, (t) = Taken By, (c) = Cosigned By    Initials Name Provider Type    Deann Ponce PT Physical Therapist        Physical Therapy Education     Title: PT OT SLP Therapies (In Progress)     Topic:  Physical Therapy (In Progress)     Point: Mobility training (In Progress)     Learning Progress Summary           Patient Acceptance, E, NR by JAMAR at 11/11/2019  1:30 PM                   Point: Body mechanics (In Progress)     Learning Progress Summary           Patient Acceptance, E, NR by JAMAR at 11/11/2019  1:30 PM                   Point: Precautions (In Progress)     Learning Progress Summary           Patient Acceptance, E, NR by KR at 11/11/2019  1:30 PM                               User Key     Initials Effective Dates Name Provider Type Discipline     04/03/18 -  Deann Mendez, PT Physical Therapist PT              PT Recommendation and Plan  Planned Therapy Interventions (PT Eval): balance training, bed mobility training, gait training, strengthening, transfer training  Outcome Summary/Treatment Plan (PT)  Anticipated Discharge Disposition (PT): home with assist  Plan of Care Reviewed With: patient  Outcome Summary: PT initial evaluation completed for pt s/p AIS presenting with generalized weakness, mild balance deficits, and decreased functional mobility. Pt ambulated 80ft with CGA and B UE support. Pt's decreased independence warrants PT skilled care. Recommend D/C home with assistance.      Time Calculation:   PT Charges     Row Name 11/11/19 1330             Time Calculation    Start Time  1330  -KR      PT Received On  11/11/19  -      PT Goal Re-Cert Due Date  11/21/19  -        User Key  (r) = Recorded By, (t) = Taken By, (c) = Cosigned By    Initials Name Provider Type    Deann Ponce PT Physical Therapist        Therapy Charges for Today     Code Description Service Date Service Provider Modifiers Qty    46817900840 HC PT EVAL LOW COMPLEXITY 4 11/11/2019 Deann Mendez, PT GP 1          PT G-Codes  Outcome Measure Options: AM-PAC 6 Clicks Basic Mobility (PT), Modified [place]  AM-PAC 6 Clicks Score (PT): 18  AM-PAC 6 Clicks Score (OT): 23  Modified Jus Scale: 1 - No significant  disability despite symptoms.  Able to carry out all usual duties and activities.    Philomena Mendez, PT  11/11/2019

## 2019-11-12 ENCOUNTER — APPOINTMENT (OUTPATIENT)
Dept: CARDIOLOGY | Facility: HOSPITAL | Age: 74
End: 2019-11-12

## 2019-11-12 VITALS
BODY MASS INDEX: 31.78 KG/M2 | DIASTOLIC BLOOD PRESSURE: 83 MMHG | WEIGHT: 222 LBS | RESPIRATION RATE: 18 BRPM | HEART RATE: 64 BPM | SYSTOLIC BLOOD PRESSURE: 137 MMHG | OXYGEN SATURATION: 99 % | TEMPERATURE: 97.9 F | HEIGHT: 70 IN

## 2019-11-12 LAB
ANION GAP SERPL CALCULATED.3IONS-SCNC: 12 MMOL/L (ref 5–15)
BASOPHILS # BLD AUTO: 0.05 10*3/MM3 (ref 0–0.2)
BASOPHILS NFR BLD AUTO: 0.5 % (ref 0–1.5)
BH CV ECHO MEAS - BSA(HAYCOCK): 2.3 M^2
BH CV ECHO MEAS - BSA: 2.2 M^2
BH CV ECHO MEAS - BZI_BMI: 31.9 KILOGRAMS/M^2
BH CV ECHO MEAS - BZI_METRIC_HEIGHT: 177.8 CM
BH CV ECHO MEAS - BZI_METRIC_WEIGHT: 100.7 KG
BH CV VAS BP RIGHT ARM: NORMAL MMHG
BUN BLD-MCNC: 11 MG/DL (ref 8–23)
BUN/CREAT SERPL: 13.6 (ref 7–25)
CALCIUM SPEC-SCNC: 8.8 MG/DL (ref 8.6–10.5)
CHLORIDE SERPL-SCNC: 104 MMOL/L (ref 98–107)
CO2 SERPL-SCNC: 23 MMOL/L (ref 22–29)
CREAT BLD-MCNC: 0.81 MG/DL (ref 0.76–1.27)
DEPRECATED RDW RBC AUTO: 45.5 FL (ref 37–54)
EOSINOPHIL # BLD AUTO: 0.31 10*3/MM3 (ref 0–0.4)
EOSINOPHIL NFR BLD AUTO: 3.3 % (ref 0.3–6.2)
ERYTHROCYTE [DISTWIDTH] IN BLOOD BY AUTOMATED COUNT: 12.7 % (ref 12.3–15.4)
GFR SERPL CREATININE-BSD FRML MDRD: 93 ML/MIN/1.73
GLUCOSE BLD-MCNC: 105 MG/DL (ref 65–99)
HCT VFR BLD AUTO: 44 % (ref 37.5–51)
HGB BLD-MCNC: 14.5 G/DL (ref 13–17.7)
IMM GRANULOCYTES # BLD AUTO: 0.07 10*3/MM3 (ref 0–0.05)
IMM GRANULOCYTES NFR BLD AUTO: 0.7 % (ref 0–0.5)
LV EF 2D ECHO EST: 40 %
LYMPHOCYTES # BLD AUTO: 1.56 10*3/MM3 (ref 0.7–3.1)
LYMPHOCYTES NFR BLD AUTO: 16.4 % (ref 19.6–45.3)
MAGNESIUM SERPL-MCNC: 2.2 MG/DL (ref 1.6–2.4)
MCH RBC QN AUTO: 32.2 PG (ref 26.6–33)
MCHC RBC AUTO-ENTMCNC: 33 G/DL (ref 31.5–35.7)
MCV RBC AUTO: 97.6 FL (ref 79–97)
MONOCYTES # BLD AUTO: 0.98 10*3/MM3 (ref 0.1–0.9)
MONOCYTES NFR BLD AUTO: 10.3 % (ref 5–12)
NEUTROPHILS # BLD AUTO: 6.55 10*3/MM3 (ref 1.7–7)
NEUTROPHILS NFR BLD AUTO: 68.8 % (ref 42.7–76)
NRBC BLD AUTO-RTO: 0 /100 WBC (ref 0–0.2)
PHOSPHATE SERPL-MCNC: 4.5 MG/DL (ref 2.5–4.5)
PLATELET # BLD AUTO: 197 10*3/MM3 (ref 140–450)
PMV BLD AUTO: 11.2 FL (ref 6–12)
POTASSIUM BLD-SCNC: 4.1 MMOL/L (ref 3.5–5.2)
RBC # BLD AUTO: 4.51 10*6/MM3 (ref 4.14–5.8)
SODIUM BLD-SCNC: 139 MMOL/L (ref 136–145)
WBC NRBC COR # BLD: 9.52 10*3/MM3 (ref 3.4–10.8)

## 2019-11-12 PROCEDURE — 99152 MOD SED SAME PHYS/QHP 5/>YRS: CPT

## 2019-11-12 PROCEDURE — 99152 MOD SED SAME PHYS/QHP 5/>YRS: CPT | Performed by: INTERNAL MEDICINE

## 2019-11-12 PROCEDURE — 99232 SBSQ HOSP IP/OBS MODERATE 35: CPT | Performed by: INTERNAL MEDICINE

## 2019-11-12 PROCEDURE — 93312 ECHO TRANSESOPHAGEAL: CPT

## 2019-11-12 PROCEDURE — 99239 HOSP IP/OBS DSCHRG MGMT >30: CPT | Performed by: NURSE PRACTITIONER

## 2019-11-12 PROCEDURE — 93320 DOPPLER ECHO COMPLETE: CPT

## 2019-11-12 PROCEDURE — 80048 BASIC METABOLIC PNL TOTAL CA: CPT | Performed by: INTERNAL MEDICINE

## 2019-11-12 PROCEDURE — 93320 DOPPLER ECHO COMPLETE: CPT | Performed by: INTERNAL MEDICINE

## 2019-11-12 PROCEDURE — 85025 COMPLETE CBC W/AUTO DIFF WBC: CPT | Performed by: INTERNAL MEDICINE

## 2019-11-12 PROCEDURE — 93312 ECHO TRANSESOPHAGEAL: CPT | Performed by: INTERNAL MEDICINE

## 2019-11-12 PROCEDURE — 92507 TX SP LANG VOICE COMM INDIV: CPT

## 2019-11-12 PROCEDURE — 93010 ELECTROCARDIOGRAM REPORT: CPT | Performed by: INTERNAL MEDICINE

## 2019-11-12 PROCEDURE — 93325 DOPPLER ECHO COLOR FLOW MAPG: CPT

## 2019-11-12 PROCEDURE — 97116 GAIT TRAINING THERAPY: CPT

## 2019-11-12 PROCEDURE — 25010000002 MIDAZOLAM PER 1 MG

## 2019-11-12 PROCEDURE — 25010000002 FENTANYL CITRATE (PF) 100 MCG/2ML SOLUTION

## 2019-11-12 PROCEDURE — 93005 ELECTROCARDIOGRAM TRACING: CPT | Performed by: NURSE PRACTITIONER

## 2019-11-12 PROCEDURE — 83735 ASSAY OF MAGNESIUM: CPT | Performed by: INTERNAL MEDICINE

## 2019-11-12 PROCEDURE — 97530 THERAPEUTIC ACTIVITIES: CPT

## 2019-11-12 PROCEDURE — 93325 DOPPLER ECHO COLOR FLOW MAPG: CPT | Performed by: INTERNAL MEDICINE

## 2019-11-12 PROCEDURE — 99232 SBSQ HOSP IP/OBS MODERATE 35: CPT | Performed by: PSYCHIATRY & NEUROLOGY

## 2019-11-12 PROCEDURE — 84100 ASSAY OF PHOSPHORUS: CPT | Performed by: INTERNAL MEDICINE

## 2019-11-12 PROCEDURE — 94799 UNLISTED PULMONARY SVC/PX: CPT

## 2019-11-12 RX ORDER — CARVEDILOL 6.25 MG/1
6.25 TABLET ORAL 2 TIMES DAILY WITH MEALS
Qty: 30 TABLET | Refills: 5 | Status: SHIPPED | OUTPATIENT
Start: 2019-11-12 | End: 2021-11-22 | Stop reason: ALTCHOICE

## 2019-11-12 RX ORDER — LISINOPRIL 5 MG/1
5 TABLET ORAL
Qty: 30 TABLET | Refills: 5 | Status: SHIPPED | OUTPATIENT
Start: 2019-11-12

## 2019-11-12 RX ORDER — ALLOPURINOL 100 MG/1
100 TABLET ORAL DAILY
Status: DISCONTINUED | OUTPATIENT
Start: 2019-11-13 | End: 2019-11-12 | Stop reason: HOSPADM

## 2019-11-12 RX ORDER — FENTANYL CITRATE 50 UG/ML
INJECTION, SOLUTION INTRAMUSCULAR; INTRAVENOUS
Status: COMPLETED
Start: 2019-11-12 | End: 2019-11-12

## 2019-11-12 RX ORDER — MIDAZOLAM HYDROCHLORIDE 1 MG/ML
INJECTION INTRAMUSCULAR; INTRAVENOUS
Status: COMPLETED
Start: 2019-11-12 | End: 2019-11-12

## 2019-11-12 RX ORDER — ASPIRIN 325 MG
325 TABLET ORAL DAILY
Qty: 90 TABLET | Refills: 3 | Status: SHIPPED | OUTPATIENT
Start: 2019-11-13

## 2019-11-12 RX ORDER — ALLOPURINOL 100 MG/1
100 TABLET ORAL DAILY
Qty: 30 TABLET | Refills: 1 | Status: SHIPPED | OUTPATIENT
Start: 2019-11-13

## 2019-11-12 RX ORDER — ATORVASTATIN CALCIUM 80 MG/1
80 TABLET, FILM COATED ORAL NIGHTLY
Qty: 30 TABLET | Refills: 5 | Status: SHIPPED | OUTPATIENT
Start: 2019-11-12

## 2019-11-12 RX ORDER — COLCHICINE 0.6 MG/1
0.6 TABLET ORAL EVERY 12 HOURS SCHEDULED
Qty: 60 TABLET | Refills: 1 | Status: SHIPPED | OUTPATIENT
Start: 2019-11-12

## 2019-11-12 RX ORDER — LISINOPRIL 5 MG/1
5 TABLET ORAL
Status: DISCONTINUED | OUTPATIENT
Start: 2019-11-12 | End: 2019-11-12 | Stop reason: HOSPADM

## 2019-11-12 RX ADMIN — BUDESONIDE AND FORMOTEROL FUMARATE DIHYDRATE 2 PUFF: 160; 4.5 AEROSOL RESPIRATORY (INHALATION) at 07:27

## 2019-11-12 RX ADMIN — METHOHEXITAL SODIUM 20 MG: 500 INJECTION, POWDER, LYOPHILIZED, FOR SOLUTION INTRAMUSCULAR; INTRAVENOUS; RECTAL at 13:22

## 2019-11-12 RX ADMIN — FOLIC ACID 1 MG: 1 TABLET ORAL at 08:32

## 2019-11-12 RX ADMIN — COLCHICINE 0.6 MG: 0.6 TABLET, FILM COATED ORAL at 08:32

## 2019-11-12 RX ADMIN — ACETAMINOPHEN 650 MG: 325 TABLET ORAL at 00:04

## 2019-11-12 RX ADMIN — ASPIRIN 325 MG ORAL TABLET 325 MG: 325 PILL ORAL at 08:32

## 2019-11-12 RX ADMIN — IPRATROPIUM BROMIDE AND ALBUTEROL SULFATE 3 ML: 2.5; .5 SOLUTION RESPIRATORY (INHALATION) at 12:02

## 2019-11-12 RX ADMIN — RIVAROXABAN 20 MG: 20 TABLET, FILM COATED ORAL at 16:16

## 2019-11-12 RX ADMIN — LISINOPRIL 5 MG: 5 TABLET ORAL at 16:13

## 2019-11-12 RX ADMIN — FENTANYL CITRATE 100 MCG: 50 INJECTION INTRAMUSCULAR; INTRAVENOUS at 13:20

## 2019-11-12 RX ADMIN — CARVEDILOL 6.25 MG: 6.25 TABLET, FILM COATED ORAL at 08:32

## 2019-11-12 RX ADMIN — MIDAZOLAM 4 MG: 1 INJECTION INTRAMUSCULAR; INTRAVENOUS at 13:20

## 2019-11-12 RX ADMIN — IPRATROPIUM BROMIDE AND ALBUTEROL SULFATE 3 ML: 2.5; .5 SOLUTION RESPIRATORY (INHALATION) at 16:43

## 2019-11-12 RX ADMIN — IPRATROPIUM BROMIDE AND ALBUTEROL SULFATE 3 ML: 2.5; .5 SOLUTION RESPIRATORY (INHALATION) at 07:27

## 2019-11-12 RX ADMIN — Medication 200 MG: at 08:32

## 2019-11-12 NOTE — PROGRESS NOTES
Oswaldo Boggs  11/10/2019  [id number]/1  [id number]    Problem List:  1. Tachycardia versus subjective questionable PAF versus nonsustained V. tach:  a. CHADsVasc 5, no prior anticoagulation  b. Echocardiogram 11/11/2019: LVEF 42%, mild dilation of the aortic root and the sinuses of Valsalva present, LV wall thickness consistent with mild concentric hypertrophy, LV diastolic dysfunction grade 1 consistent with impaired relaxation, no LV mass or thrombus, normal RV cavity size, wall thickness and septal motion noted with mildly reduced RV systolic function, saline test results negative, no pulmonary hypertension, no pericardial effusion, no significant structural valvular abnormality demonstrated  2. 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 Hypertensive cardiovascular disease  a. Remote stress test approximately 8 months ago; negative per patient-data deficit  b. Residual class I symptoms  4. Hypertension  5. Dyslipidemia; on statin therapy  6. Type 2 diabetes mellitus with hemoglobin A1c 5.8% November 2019  7. Alcohol use: at least a fifith daily, quit 4 days prior to admission  8. Bronchiectasis with 2 L nocturnal oxygen  9. Mild obesity: BMI 31.95  10. Nephrolithiasis  11. Glaucoma  12. 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 Surgical history:  a. Cataract extraction  b. Inguinal hernia repair x2  c. Umbilical hernia repair       Patient going for JAE today with Dr. Edgar, 1300.  Patient has been NPO p MN.  Risks and benefits reviewed with patient and he is willing to proceed.  Further recommendations based on findings.      Vitals stable  130/85  HR 87   RR 18  97% O2 via nasal cannula.    EKG:  Normal Sinus Rhythm    LABS:    BMP:  Glu 105, Na 139, K+ 4.1, CO2 23, Chl 104, BUN 11, Cr 0.81    M.2    CBC:  WBC 9.52, Hgb 14.5, HCT 44, Plt 197    YANNI Mueller MD, [facility]

## 2019-11-12 NOTE — PLAN OF CARE
Problem: Patient Care Overview  Goal: Plan of Care Review  Outcome: Ongoing (interventions implemented as appropriate)   11/12/19 1222   Coping/Psychosocial   Plan of Care Reviewed With patient;spouse   Plan of Care Review   Progress improving   OTHER   Outcome Summary Pt demonstrated ability to progress forward ambulation distance to 180 total ft; cont to be limited by c/o L foot pain. Would benefit from trial of gait with STC next session if pain persists.        Problem: Stroke (Ischemic) (Adult)  Goal: Signs and Symptoms of Listed Potential Problems Will be Absent, Minimized or Managed (Stroke)  Outcome: Ongoing (interventions implemented as appropriate)   11/12/19 1222   Goal/Outcome Evaluation   Problems Assessed (Stroke (Ischemic)) cognitive impairment;communication impairment;motor/sensory impairment   Problems Assessed (Stroke (Ischemic)) motor/sensory impairment;communication impairment

## 2019-11-12 NOTE — THERAPY TREATMENT NOTE
Acute Care - Speech Language Pathology Treatment Note  [facility]     Patient Name: Oswaldo Boggs  : 1945  MRN: [id number]  Today's Date: 2019         Admit Date: 11/10/2019    Visit Dx:      ICD-10-CM ICD-9-CM   1. Cerebrovascular accident (CVA) due to embolism of cerebral artery (CMS/HCC) I63.40 434.11   2. Cognitive communication deficit R41.841 799.52     Patient Active Problem List   Diagnosis   • Bronchiectasis (CMS/HCC)   • Dyslipidemia   • PPD positive   • AIS s/p tPA   • PAF (paroxysmal atrial fibrillation) (CMS/HCC)   • Hypertension   • Alcohol abuse        Therapy Treatment  Rehabilitation Treatment Summary     Row Name 19             Treatment Time/Intention    Discipline  speech language pathologist  (Pended)   -      Document Type  therapy note (daily note)  (Pended)   -KK      Subjective Information  no complaints  (Pended)   -KK      Mode of Treatment  individual therapy;speech-language pathology  (Pended)   -KK      Patient/Family Observations  Wife present and reports that speech is at BL  (Pended)   -KK      Care Plan Review  care plan/treatment goals reviewed;patient/other agree to care plan  (Pended)   -KK      Therapy Frequency (SLP SLC)  5 days per week  (Pended)   -KK      Patient Effort  good  (Pended)   -KK      Recorded by [KK] Shanta Monk, Speech Therapy Student 19      Row Name 19             Pain Assessment    Additional Documentation  Pain Scale: FACES Pre/Post-Treatment (Group)  (Pended)   -KK      Recorded by [KK] Shanta Monk, Speech Therapy Student 19      Row Name 19             Pain Scale: FACES Pre/Post-Treatment    Pain: FACES Scale, Pretreatment  0-->no hurt  (Pended)   -KK      Pain: FACES Scale, Post-Treatment  0-->no hurt  (Pended)   -KK      Recorded by [KK] Shanta Monk, Speech Therapy Student 1910      Row Name 19             Outcome Summary/Treatment Plan (SLP)    Daily  Summary of Progress (SLP)  progress toward functional goals is good  (Pended)   -KK      Plan for Continued Treatment (SLP)  Pt and wife report SLC skills are at BL. Good progress on goals. Pt request that we f/u once he's on the floor.  (Pended)   -KK      Anticipated Dischage Disposition  unknown;anticipate therapy at next level of care  (Pended)   -KK      Recorded by [KK] Shanta Monk, Speech Therapy Student 11/12/19 0958        User Key  (r) = Recorded By, (t) = Taken By, (c) = Cosigned By    Initials Name Effective Dates Discipline    KK Shanta Monk, Speech Therapy Student 07/26/19 -  SLP          EDUCATION  The patient has been educated in the following areas:   Cognitive Impairment Communication Impairment.    SLP Recommendation and Plan  Daily Summary of Progress (SLP): (P) progress toward functional goals is good     Plan for Continued Treatment (SLP): (P) Pt and wife report SLC skills are at BL. Good progress on goals. Pt request that we f/u once he's on the floor.  Anticipated Dischage Disposition: (P) unknown, anticipate therapy at next level of care             SLP GOALS     Row Name 11/12/19 0930 11/11/19 3412          Articulation Goal 1 (SLP)    Improve Articulation Goal 1 (SLP)  by over-articulating in connected speech;90%;independently (over 90% accuracy)  (Pended)   -KK  by over-articulating in connected speech;90%;independently (over 90% accuracy)  -AC     Time Frame (Articulation Goal 1, SLP)  short term goal (STG);by discharge  (Pended)   -KK  short term goal (STG);by discharge  -AC     Progress (Articulation Goal 1, SLP)  80%;independently (over 90% accuracy)  (Pended)   -KK  --     Progress/Outcomes (Articulation Goal 1, SLP)  good progress toward goal  (Pended)   -KK  --     Comment (Articulation Goal 1, SLP)  Pt reading upon SLP arrival. Pt over-artic when asked to read allowed.   (Pended)   -KK  --        Memory Skills Goal 1 (SLP)    Improve Memory Skills Through Goal 1 (SLP)   recalling unrelated word lists immediately;recalling unrelated word lists with an imposed delay;use memory strategies;80%;with minimal cues (75-90%)  (Pended)   -KK  recalling unrelated word lists immediately;recalling unrelated word lists with an imposed delay;use memory strategies;80%;with minimal cues (75-90%)  -AC     Time Frame (Memory Skills Goal 1, SLP)  short term goal (STG);by discharge  (Pended)   -KK  short term goal (STG);by discharge  -AC     Progress (Memory Skills Goal 1, SLP)  70%;with moderate cues (50-74%)  (Pended)   -KK  --     Progress/Outcomes (Memory Skills Goal 1, SLP)  continuing progress toward goal  (Pended)   -KK  --        Functional Problem Solving Skills Goal 1 (SLP)    Improve Problem Solving Through Goal 1 (SLP)  determine solutions to multifactorial problems;complete organization/home management task;80%;with minimal cues (75-90%)  (Pended)   -KK  determine solutions to multifactorial problems;complete organization/home management task;80%;with minimal cues (75-90%)  -AC     Time Frame (Problem Solving Goal 1, SLP)  short term goal (STG);by discharge  (Pended)   -KK  short term goal (STG);by discharge  -AC     Progress/Outcomes (Problem Solving Goal 1, SLP)  goal ongoing  (Pended)   -KK  --        Additional Goal 1 (SLP)    Additional Goal 1, SLP  LTG: Pt will improve cognitive-communication skills in order to participate in care w/ 100% acc w/o cues.  (Pended)   -KK  LTG: Pt will improve cognitive-communication skills in order to participate in care w/ 100% acc w/o cues.  -AC     Time Frame (Additional Goal 1, SLP)  by discharge  (Pended)   -KK  by discharge  -AC     Progress/Outcomes (Additional Goal 1, SLP)  good progress toward goal  (Pended)   -KK  --       User Key  (r) = Recorded By, (t) = Taken By, (c) = Cosigned By    Initials Name Provider Type    Sarah Eaton, MS CCC-SLP Speech and Language Pathologist    KK Shanta Monk, Speech Therapy Student Speech Therapy  Student              Time Calculation:     Time Calculation- SLP     Row Name 11/12/19 1001             Time Calculation- SLP    SLP Start Time  0930  (Pended)   -      SLP Received On  11/12/19  (Pended)   -FRANKY        User Key  (r) = Recorded By, (t) = Taken By, (c) = Cosigned By    Initials Name Provider Type    Shanta Negrete, Speech Therapy Student Speech Therapy Student          Therapy Charges for Today     Code Description Service Date Service Provider Modifiers Qty    [id number]  ST TREATMENT SPEECH 2 11/12/2019 Shanta Monk, Speech Therapy Student GN 1                     Shanta Monk Speech Therapy Student  11/12/2019

## 2019-11-12 NOTE — PROGRESS NOTES
"[facility] Daily Note       LOS: 2 days   Patient Care Team:  Gillian Acuña APRN as PCP - General (Family Medicine)    Chief Complaint:  CVA/PAF?    Subjective     Subjective: No complaints this morning.  Would like to go home.  Feeling good.    Review of Systems:   As above.    Medications:    aspirin 325 mg Oral Daily   Or      aspirin 300 mg Rectal Daily   atorvastatin 80 mg Oral Nightly   azithromycin 250 mg Oral Once per day on Mon Wed Fri   budesonide-formoterol 2 puff Inhalation BID - RT   carvedilol 6.25 mg Oral BID With Meals   colchicine 0.6 mg Oral Q12H   folic acid 1 mg Oral Daily   ipratropium-albuterol 3 mL Nebulization 4x Daily - RT   latanoprost 1 drop Both Eyes Nightly   sodium chloride 10 mL Intravenous Q12H   thiamine 200 mg Oral BID       Objective     Vital Sign Min/Max for last 24 hours  Temp  Min: 98 °F (36.7 °C)  Max: 98.3 °F (36.8 °C)   BP  Min: 118/80  Max: 160/91   Pulse  Min: 64  Max: 105   Resp  Min: 16  Max: 18   SpO2  Min: 92 %  Max: 100 %   Flow (L/min)  Min: 2  Max: 2   Weight  Min: 101 kg (222 lb 10.6 oz)  Max: 101 kg (222 lb 10.6 oz)      Intake/Output Summary (Last 24 hours) at 11/12/2019 0844  Last data filed at 11/12/2019 0400  Gross per 24 hour   Intake 296.8 ml   Output 1150 ml   Net -853.2 ml        Flowsheet Rows      First Filed Value   Admission Height  177.8 cm (70\") Documented at 11/10/2019 1854   Admission Weight  101 kg (223 lb 5.2 oz) Documented at 11/10/2019 1854          Physical Exam:    General: Alert and oriented.   Cardiovascular: Heart has a nondisplaced focal PMI. Regular rate and rhythm without murmur, gallop or rub.  Lungs: Clear without rales or wheezes. Equal expansion is noted.   Abdomen: Soft, nontender.  Extremities: Show no edema.   Skin: warm and dry.     Results Review:    I reviewed the patient's new clinical results.  EKG:  Tele: Sinus rhythm    Labs:    Results from last 7 days   Lab Units 11/12/19  0431 11/11/19  0630   SODIUM " mmol/L 139 138   POTASSIUM mmol/L 4.1 3.6   CHLORIDE mmol/L 104 102   CO2 mmol/L 23.0 24.0   BUN mg/dL 11 9   CREATININE mg/dL 0.81 0.77   CALCIUM mg/dL 8.8 8.2*   BILIRUBIN mg/dL  --  0.9   ALK PHOS U/L  --  58   ALT (SGPT) U/L  --  11   AST (SGOT) U/L  --  21   GLUCOSE mg/dL 105* 101*     Results from last 7 days   Lab Units 11/12/19  0431 11/11/19  0630   WBC 10*3/mm3 9.52 7.96   HEMOGLOBIN g/dL 14.5 14.3   HEMATOCRIT % 44.0 43.5   PLATELETS 10*3/mm3 197 190     No results found for: TROPONINI, TROPONINT  Lab Results   Component Value Date    CHOL 241 (H) 11/11/2019     Lab Results   Component Value Date    TRIG 231 (H) 11/11/2019     Lab Results   Component Value Date    HDL 29 (L) 11/11/2019     No components found for: LDLCALC  No results found for: INR, PROTIME      Ejection Fraction:    Assessment   Assessment:    1. Possible previous paroxysmal atrial fibrillation  1. Previous stress test approximately 1 year ago with Dr. Hodge  2. LVEF estimated 42% with mild dilation of the aortic root 11/11/2019  2. 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 Nonsustained ventricular tachycardia, 9 beats 11/11/2019 at 7 PM  4. Hypertension  5. Dyslipidemia  6. Type 2 diabetes mellitus  7. Alcohol abuse    Plan:    Transesophageal echocardiogram today.  Watch for further ventricular arrhythmias  Consider the addition of lisinopril or losartan due to the reduced ejection fraction     Holly Edgar MD  11/12/19  8:44 AM    The transesophageal echo showed no source of embolus.  Specifically the left atrial appendage appeared to be free of thrombus in the velocities were 80 cm/s.  The LV function is mildly to moderate moderately depressed estimated at 40% however no thrombus is seen within the left ventricle.  No tumors are seen within the left atrium or the right atrium.  There is no PFO or ASD.  The aortic and mitral valves show no evidence of fibroblastoma.     From a stroke standpoint I  think the patient should be discharged on 325 aspirin as well as 20 mg of Xarelto.  This was discussed with the patient prior to the JAE.    Of note on JAE the patient definitely had signs of sleep apnea.  A sleep study outpatient is recommended.    Discharge home today is okay with me.  I have spoken with Dr. Hodge's physician assistant Jacky Casey.  The patient will be set up for 30-day monitor on discharge.  They are also aware of his 9 beat run of ventricular tachycardia and his EF of approximately 40% by echo..     I think at discharge the patient should also have 5 mg of lisinopril added for his reduced ejection fraction.      Holly Edgra MD, FACC

## 2019-11-12 NOTE — PLAN OF CARE
Problem: Patient Care Overview  Goal: Plan of Care Review  Outcome: Ongoing (interventions implemented as appropriate)   11/12/19 0930   Coping/Psychosocial   Plan of Care Reviewed With patient;spouse   Plan of Care Review   Progress improving   SLP treatment completed. Will continue to address cognitive-communication deficits. Please see note for further details and recommendations.      Problem: Stroke (Ischemic) (Adult)  Goal: Signs and Symptoms of Listed Potential Problems Will be Absent, Minimized or Managed (Stroke)  Outcome: Ongoing (interventions implemented as appropriate)   11/12/19 0930   Goal/Outcome Evaluation   Problems Assessed (Stroke (Ischemic)) cognitive impairment;communication impairment   Problems Assessed (Stroke (Ischemic)) cognitive impairment;communication impairment

## 2019-11-12 NOTE — PROGRESS NOTES
"Neurology       Patient Care Team:  Gillian Acuña APRN as PCP - General (Family Medicine)    Chief complaint: I want to go home    History: Patient is essentially back to baseline  His vision.    Seen Dr. Edgar and had his JAE.      Past Medical History:   Diagnosis Date   • Glaucoma    • History of positive PPD, untreated    • Hypertension    • Nephrolithiasis    • PAF (paroxysmal atrial fibrillation) (CMS/HCC)    • Personal history of renal calculi     x1, passed spontaneously        Vital Signs   Vitals:    11/12/19 0900 11/12/19 1000 11/12/19 1100 11/12/19 1200   BP: 130/93 134/86 136/89 132/87   BP Location:       Patient Position:       Pulse: 84 84 83 85   Resp:  18  16   Temp:    97.4 °F (36.3 °C)   TempSrc:    Axillary   SpO2: 95% 96% 95% 96%   Weight:    101 kg (222 lb)   Height:    177.8 cm (70\")       Physical Exam:   General: Awake and alert              Neuro: Vented to person place and time.    Again left lateral visual field is constricted.    Coordination is minimally abnormal finger-to-nose testing on the right.        Results Review:  JAE showed no evidence of PFO or embolic source.      Results from last 7 days   Lab Units 11/12/19  0431   WBC 10*3/mm3 9.52   HEMOGLOBIN g/dL 14.5   HEMATOCRIT % 44.0   PLATELETS 10*3/mm3 197     Results from last 7 days   Lab Units 11/12/19  0431 11/11/19  0630   SODIUM mmol/L 139 138   POTASSIUM mmol/L 4.1 3.6   CHLORIDE mmol/L 104 102   CO2 mmol/L 23.0 24.0   BUN mg/dL 11 9   CREATININE mg/dL 0.81 0.77   CALCIUM mg/dL 8.8 8.2*   BILIRUBIN mg/dL  --  0.9   ALK PHOS U/L  --  58   ALT (SGPT) U/L  --  11   AST (SGOT) U/L  --  21   GLUCOSE mg/dL 105* 101*       Imaging Results (Last 24 Hours)     Procedure Component Value Units Date/Time    CT Head Without Contrast [[id number]] Collected:  11/11/19 1852     Updated:  11/12/19 0905    Narrative:       EXAMINATION: CT HEAD WO CONTRAST-11/11/2019:      INDICATION: Stroke; I63.40-Cerebral infarction due to " embolism of  unspecified cerebral artery; R41.841-Cognitive communication deficit.     TECHNIQUE: Unenhanced CT imaging of the head was performed.     The radiation dose reduction device was turned on for each scan per the  ALARA (As Low as Reasonably Achievable) protocol.     COMPARISON: CT of the head with CTA exam 11/10/2019.     FINDINGS: Unenhanced CT imaging of the head following TPA administration  demonstrates no evidence of midline shift. No evidence of hydrocephalus.  Diffuse cerebral atrophy is identified most predominantly involving the  frontal lobes. No extra-axial fluid collections are identified. No acute  intracranial hemorrhage is appreciated. Hypoattenuation within the  inferior right occipital region, similar to the exam performed on  11/10/2019 is again identified. This may be within the superior right  cerebellum. The visualized calvarium appears intact. The paranasal  sinuses are clear with the exception of ethmoid sinus mucosal thickening  and incompletely imaged maxillary sinus mucosal thickening.       Impression:       1.  No acute intracranial hemorrhage identified.  2.  Redemonstration of hypoattenuated region within the either inferior  right occipital region or possibly superior cerebellar region, slightly  increased from 11/10/2019, which could relate to an evolving infarct.  Consider follow-up MRI imaging of the brain if the patient is a  candidate.     D:  11/11/2019  E:  11/12/2019                   Assessment:  Multiple acute embolic strokes    Strong suspicion of paroxysmal atrial fibrillation.    Sleep apnea    Plan:  Okay to discharge on Xarelto and aspirin.    30-day monitor with outpatient follow-up by Dr. Hodge.    An outpatient sleep study.    Wife is to monitor the patient's driving and if it appears that his visual restrictions create problems should restrict his vision or having test of the [place]    Comment:  The fact that his visual field defect is unilateral  would suggest that he may well be able to drive without major difficulties.         I discussed the patients findings and my recommendations with patient, family and nursing staff    Binu Hendrix MD  11/12/19  3:05 PM

## 2019-11-12 NOTE — PROGRESS NOTES
Continued Stay Note  [facility]     Patient Name: Oswaldo Boggs  MRN: [id number]  Today's Date: 11/12/2019    Admit Date: 11/10/2019    Discharge Plan     Row Name 11/12/19 1252       Plan    Plan  Rheumatology Appointment    Plan Comments  CM asked by MD to arrange follow up appointment with rheumatologist at [facility].  CM called [facility] and was told that patient would need to be referred by his PCP unless he was seen by one of their physicians while in the hospital.  [facility] spoke with patient's PCP's office.  They requested patient's lab results to be faxed to their office, specifically patient's sed rate.  Patient's PCP will look over labs and make referral if needed.  Patient has follow up appointment with PCP scheduled and placed in AVS.  CM will continue to follow.  Jessica Nixon RN [phone]    Final Discharge Disposition Code  01 - home or self-care        Discharge Codes    No documentation.       Expected Discharge Date and Time     Expected Discharge Date Expected Discharge Time    Nov 14, 2019             Jessica Nixon RN

## 2019-11-12 NOTE — THERAPY TREATMENT NOTE
Patient Name: Oswaldo Boggs  : 1945    MRN: [id number]                              Today's Date: 2019       Admit Date: 11/10/2019    Visit Dx:     ICD-10-CM ICD-9-CM   1. Cerebrovascular accident (CVA) due to embolism of cerebral artery (CMS/HCC) I63.40 434.11   2. Cognitive communication deficit R41.841 799.52     Patient Active Problem List   Diagnosis   • Bronchiectasis (CMS/HCC)   • Dyslipidemia   • PPD positive   • AIS s/p tPA   • PAF (paroxysmal atrial fibrillation) (CMS/HCC)   • Hypertension   • Alcohol abuse     Past Medical History:   Diagnosis Date   • Glaucoma    • History of positive PPD, untreated    • Hypertension    • Nephrolithiasis    • PAF (paroxysmal atrial fibrillation) (CMS/[facility])    • Personal history of renal calculi     x1, passed spontaneously      Past Surgical History:   Procedure Laterality Date   • CATARACT EXTRACTION     • COLONOSCOPY     • INGUINAL HERNIA REPAIR      x2   • UMBILICAL HERNIA REPAIR       General Information     Row Name 19 1218          PT Evaluation Time/Intention    Document Type  therapy note (daily note)  -     Mode of Treatment  physical therapy  -     Row Name 19 1218          General Information    Existing Precautions/Restrictions  fall  -     Row Name 19 1218          Cognitive Assessment/Intervention- PT/OT    Orientation Status (Cognition)  oriented x 4  -       User Key  (r) = Recorded By, (t) = Taken By, (c) = Cosigned By    Initials Name Provider Type    LS Taylor Salmeron, PT Physical Therapist        Mobility     Row Name 19 1218          Bed Mobility Assessment/Treatment    Comment (Bed Mobility)  UIC  -     Row Name 19 1218          Sit-Stand Transfer    Sit-Stand [place] (Transfers)  supervision;verbal cues  -     Row Name 19 1218          Gait/Stairs Assessment/Training    Gait/Stairs Assessment/Training  gait/ambulation independence  -     [place] Level (Gait)  contact  guard;verbal cues  -LS     Assistive Device (Gait)  other (see comments) BUE support on tele monitor due to L foot pain  -LS     Distance in Feet (Gait)  180  -LS     Deviations/Abnormal Patterns (Gait)  left sided deviations;antalgic  -LS     Bilateral Gait Deviations  forward flexed posture;heel strike decreased  -LS     Comment (Gait/Stairs)  VC for upright posture; declined attempt of gait with RW or STC (for L foot pain management). Distance limited by pain/ fatigue; no noted LOB.  -LS       User Key  (r) = Recorded By, (t) = Taken By, (c) = Cosigned By    Initials Name Provider Type    Taylor Sarah, PT Physical Therapist        Obj/Interventions     Row Name 11/12/19 1219          Therapeutic Exercise    Lower Extremity (Therapeutic Exercise)  LAQ (long arc quad), bilateral  -LS     Lower Extremity Range of Motion (Therapeutic Exercise)  ankle dorsiflexion/plantar flexion, bilateral;hip flexion/extension, bilateral  -LS     Exercise Type (Therapeutic Exercise)  AROM (active range of motion)  -LS     Position (Therapeutic Exercise)  seated  -LS     Sets/Reps (Therapeutic Exercise)  1/10  -LS     Row Name 11/12/19 1219          Static Sitting Balance    Level of [place] (Unsupported Sitting, Static Balance)  supervision  -LS     Sitting Position (Unsupported Sitting, Static Balance)  sitting in chair  -     Row Name 11/12/19 1219          Static Standing Balance    Level of [place] (Supported Standing, Static Balance)  supervision  -LS       User Key  (r) = Recorded By, (t) = Taken By, (c) = Cosigned By    Initials Name Provider Type    Taylor Sarah, PT Physical Therapist        Goals/Plan    No documentation.       Clinical Impression     Row Name 11/12/19 1220          Pain Scale: Numbers Pre/Post-Treatment    Pain Scale: Numbers, Pretreatment  6/10  -LS     Pain Scale: Numbers, Post-Treatment  6/10  -LS     Pain Location - Side  Left  -LS     Pain Location  foot  -LS     Pre/Post  Treatment Pain Comment  tolerated; RN aware  -LS     Pain Intervention(s)  Repositioned;Ambulation/increased activity  -     Row Name 11/12/19 1220          Plan of Care Review    Plan of Care Reviewed With  patient;spouse  -     Row Name 11/12/19 1220          Vital Signs    Pre Systolic BP Rehab  140  -LS     Pre Treatment Diastolic BP  89  -LS     Post Systolic BP Rehab  130  -LS     Post Treatment Diastolic BP  93  -LS     Pretreatment Heart Rate (beats/min)  84  -LS     Posttreatment Heart Rate (beats/min)  101  -LS     Pre SpO2 (%)  96  -LS     O2 Delivery Pre Treatment  room air  -LS     Post SpO2 (%)  96  -LS     O2 Delivery Post Treatment  room air  -LS     Pre Patient Position  Sitting  -LS     Intra Patient Position  Standing  -LS     Post Patient Position  Sitting  -LS     Row Name 11/12/19 1220          Positioning and Restraints    Pre-Treatment Position  sitting in chair/recliner  -LS     Post Treatment Position  chair  -LS     In Chair  notified nsg;reclined;call light within reach;legs elevated;encouraged to call for assist  -LS       User Key  (r) = Recorded By, (t) = Taken By, (c) = Cosigned By    Initials Name Provider Type    Taylor Sarah, PT Physical Therapist        Outcome Measures     Row Name 11/12/19 1221          How much help from another person do you currently need...    Turning from your back to your side while in flat bed without using bedrails?  4  -LS     Moving from lying on back to sitting on the side of a flat bed without bedrails?  3  -LS     Moving to and from a bed to a chair (including a wheelchair)?  3  -LS     Standing up from a chair using your arms (e.g., wheelchair, bedside chair)?  3  -LS     Climbing 3-5 steps with a railing?  3  -LS     To walk in hospital room?  3  -LS     AM-PAC 6 Clicks Score (PT)  19  -LS       User Key  (r) = Recorded By, (t) = Taken By, (c) = Cosigned By    Initials Name Provider Type    Taylor Sarah, PT Physical Therapist         Physical Therapy Education     Title: PT OT SLP Therapies (In Progress)     Topic: Physical Therapy (Done)     Point: Mobility training (Done)     Learning Progress Summary           Patient Acceptance, E,D, VU,NR by HUSSAIN at 11/12/2019 12:22 PM    Acceptance, E, NR by KR at 11/11/2019  1:30 PM                   Point: Home exercise program (Done)     Learning Progress Summary           Patient Acceptance, E,D, VU,NR by LS at 11/12/2019 12:22 PM                   Point: Body mechanics (Done)     Learning Progress Summary           Patient Acceptance, E,D, VU,NR by LS at 11/12/2019 12:22 PM    Acceptance, E, NR by KR at 11/11/2019  1:30 PM                   Point: Precautions (Done)     Learning Progress Summary           Patient Acceptance, E,D, VU,NR by LS at 11/12/2019 12:22 PM    Acceptance, E, NR by KR at 11/11/2019  1:30 PM                               User Key     Initials Effective Dates Name Provider Type Discipline     06/19/15 -  Taylor Salmeron, PT Physical Therapist PT    JAMAR 04/03/18 -  Deann Mendez, PT Physical Therapist PT              PT Recommendation and Plan     Plan of Care Reviewed With: patient, spouse  Progress: improving  Outcome Summary: Pt demonstrated ability to progress forward ambulation distance to 180 total ft; cont to be limited by c/o L foot pain. Would benefit from trial of gait with STC next session if pain persists.      Time Calculation:   PT Charges     Row Name 11/12/19 0840             Time Calculation    Start Time  0840  -      PT Received On  11/12/19  -         Time Calculation- PT    Total Timed Code Minutes- PT  25 minute(s)  -         Timed Charges    35314 - PT Therapeutic Exercise Minutes  2  -      14633 - Gait Training Minutes   12  -      83508 - PT Therapeutic Activity Minutes  11  -        User Key  (r) = Recorded By, (t) = Taken By, (c) = Cosigned By    Initials Name Provider Type     Taylor Salmeron, PT Physical Therapist        Therapy  Charges for Today     Code Description Service Date Service Provider Modifiers Qty    70156442626 HC GAIT TRAINING EA 15 MIN 11/12/2019 Taylor Salmeron, PT GP 1    21774189376 HC PT THERAPEUTIC ACT EA 15 MIN 11/12/2019 Taylor Salmeron, PT GP 1          PT G-Codes  Outcome Measure Options: AM-PAC 6 Clicks Basic Mobility (PT), Modified [place]  AM-PAC 6 Clicks Score (PT): 19  AM-PAC 6 Clicks Score (OT): 23  Modified [place] Scale: 1 - No significant disability despite symptoms.  Able to carry out all usual duties and activities.    Taylor Salmeron, PT  11/12/2019

## 2019-11-12 NOTE — PLAN OF CARE
Problem: Patient Care Overview  Goal: Plan of Care Review  Outcome: Ongoing (interventions implemented as appropriate)   11/11/19 0902 11/12/19 0200 11/12/19 0307   Coping/Psychosocial   Plan of Care Reviewed With --  patient;spouse --    Plan of Care Review   Progress improving --  --    OTHER   Outcome Summary --  --  NIH 1, no neuro changes noted this shift. EKG scheduled for this morning, JAE planned for tomorrow. VSS, will continue to monitor and assess. Wife at bedside.        Problem: Stroke (Ischemic) (Adult)  Goal: Signs and Symptoms of Listed Potential Problems Will be Absent, Minimized or Managed (Stroke)  Outcome: Ongoing (interventions implemented as appropriate)   11/12/19 0307   Goal/Outcome Evaluation   Problems Assessed (Stroke (Ischemic)) all   Problems Assessed (Stroke (Ischemic)) communication impairment       Problem: Fall Risk (Adult)  Goal: Identify Related Risk Factors and Signs and Symptoms  Outcome: Ongoing (interventions implemented as appropriate)   11/12/19 0307   Fall Risk (Adult)   Related Risk Factors (Fall Risk) environment unfamiliar;neuro disease/injury   Signs and Symptoms (Fall Risk) presence of risk factors     Goal: Absence of Fall  Outcome: Ongoing (interventions implemented as appropriate)   11/12/19 0307   Fall Risk (Adult)   Absence of Fall making progress toward outcome       Problem: Infection, Risk/Actual (Adult)  Goal: Identify Related Risk Factors and Signs and Symptoms  Outcome: Ongoing (interventions implemented as appropriate)   11/12/19 0307   Infection, Risk/Actual (Adult)   Related Risk Factors (Infection, Risk/Actual) medication effects     Goal: Infection Prevention/Resolution  Outcome: Ongoing (interventions implemented as appropriate)   11/12/19 0307   Infection, Risk/Actual (Adult)   Infection Prevention/Resolution making progress toward outcome

## 2019-11-12 NOTE — PROGRESS NOTES
Clinical Nutrition   Reason For Visit: MDR, Need for education    Patient Name: Oswaldo Boggs  YOB: 1945  MRN: [id number]  Date of Encounter: 11/12/19 10:28 AM  Admission date: 11/10/2019      Nutrition Assessment     Admission Problem List:  CVA, multiple embolic strokes  S/p tPA  Planning for JAE today (11/12)      Applicable PMH:  HTN  HLD  PAF  Gout  EtOH use       Reported/Observed/Food/Nutrition Related History     RD spoke with pt at the bedside. Pt states that he has never had nutrition education in regards to gout. RD discussed foods high, moderate, and low in purines. Pt reports that he used to drink alcohol, but has not drank alcohol in 2 weeks. Pt states that he thinks the alcohol might have been causing his gout pain. RD provided pt with education materials from the [employer].       Anthropometrics   Height: 70 in  Weight: 223 lb per bed scale (11/11)  BMI: 32.0  BMI classification: Obese Class I: 30-34.9kg/m2       Medications reviewed   Medications reviewed: Yes  Pertinent: 100 mg 2x/day PO thiamin, 1 mg/day PO folic acid  PRN: tylenol     Current Nutrition Prescription   PO: NPO Diet NPO Except: Sips With Meds for JAE today (11/12)    Evaluation of Received Nutrient/Fluid Intake:  Insufficient data       Nutrition Diagnosis   11/12  Problem Food and nutrition knowledge deficit   Etiology No prior education per pt   Signs/Symptoms Pt reports that he has not had previous nutrition education in regards to diet and gout         Intervention   Intervention: Follow treatment progress, Care plan reviewed, Education provided  Pt states that he has never had nutrition education in regards to gout. RD discussed foods high, moderate, and low in purines. Pt reports that he used to drink alcohol, but has not drank alcohol in 2 weeks. Pt states that he thinks the alcohol might have been causing his gout pain. RD provided pt with education materials from the [employer].       Goal:   General: Nutrition support treatment  PO: Establish PO      Monitoring/Evaluation:       Monitoring/Evaluation: Per protocol, PO intake, Pertinent labs, Symptoms  Will Continue to follow per protocol  Liz Duncan MS RD/LD CNSC  Time Spent: 30 minutes

## 2019-11-12 NOTE — PROGRESS NOTES
Patient is on Xarelto Education provided on Xarelto verbally and in writing.  Discussed effects of medication,  drug-drug and drug-food interactions, and signs/symptoms of bleeding and clotting.  Patient verbalized understanding through teach back.  All pertinent questions were answered.       Augustina Kunz, Pharmacy Intern

## 2019-11-12 NOTE — DISCHARGE SUMMARY
"Discharge Summary    Patient name: Oswaldo Boggs  CSN: [id number]  MRN: [id number]  : 1945  Today's date: 2019     Date of Admission: 11/10/2019  Date of Discharge:  2019    Admitting Physician:  Ela Myrick MD  Primary Care Provider: Gillian Acuña APRN  Consultations:   Prince Clement MD Neurology     Admission Diagnosis: Acute ischemic stroke s/p tPA      Discharge Diagnoses:   Active Hospital Problems    Diagnosis   • **AIS s/p tPA   • PAF (paroxysmal atrial fibrillation) (CMS/[facility])     Followed by Dr. Hodge. Started on Diltiazem.     • Hypertension   • Alcohol abuse     States he drank at least 1/5 if not more daily, but quit 4 days prior to admission on 11/10/2019.     • Dyslipidemia     On no therapy     • PPD positive     A.  Age 12 he was exposed to his stepfather who had active tuberculosis and he was      hospitalized for 6 months in a [facility] and [place], [place].  He did have a positive      PPD but did not require any therapy and never developed active TB.           Allergies:  Amoxil [amoxicillin]    Code Status and Medical Interventions:   Ordered at: 11/10/19 1836     Level Of Support Discussed With:    Patient     Code Status:    CPR     Medical Interventions (Level of Support Prior to Arrest):    Full     Procedures/Testin/10 CT perfusion  11/10 CTA head/neck   11/10 MRI brain    Echocardiogram    Transesophageal echocardiogram     History of Present Illness:  Oswaldo Boggs is a 74 y.o. male that [facility] with complaints of speech difficulty concerning for stroke.      Last known normal was 1500 the day of admission. The patient was using the restroom when he became dizzy accompanied by slurred speech. He stated that he \"felt like I am drunk but I have not drank any alcohol\".  While in the restroom he felt like he had a \"clogged ear\" treated " "with hydrogen peroxide but then became dizzy and had to sit down. He denied a fall. Patient admitted to drinking \"a lot\" (> fifth daily) and recently ceased altogether 4 days prior to admission. He denied a history of \"blacking out\" or seizures with his alcohol use. He denied use of tobacco products but has a history of bronchiectasis and positive PPD.  He uses 2 L of oxygen at night baseline.      On OSH arrival initial NIH score was 2 with initiation of tPA and was transferred to [facility] for higher level of care.     Hospital Course:  Mr. Boggs was admitted for reasons mentioned above in the HPI and implemented on post-tPA ischemic stroke order set. On [facility] arrival NIH improved to 1. Stat CTP without perfusion defect and CTA head/neck showed severe bilateral vertebral disease with no significant carotid stenosis. He was evaluated by neurology who suggested MRI that observed recent infarction within the medial right parieto-occipital lobe and right superior cerebellar hemisphere and felt the vertebral disease was manageable medically.     Cardioembolic work-up ensued with initial echocardiogram results showing decreased EF 42%, moderate LV dilation, and grade I diastolic dysfunction without intra-cardiac thrombus or evidence of PFO. There was concern regarding history of paroxsymal atrial fibrillation and cardiology was consulted and underwent subsequent JAE on 11/12  that was negative for cardiac thrombus or PFO with EF reduced at 40%. Intra-procedure he did exhibit signs consistent with sleep apnea resolved upon awakening.     Throughout hospitalization the patient has demonstrated overall improvement in clinical status. Due to his history of ETOH use he was placed on multivitamin replacement without evidence of withdrawal or complication. He has been followed by ancillary services including physical, occupational, and speech therapies able to ambulate independently and was placed on diet without concern for " "aspiration. There were complaints of left foot pain attributed to gout improved with colchicine, allopurinol, and education regarding a \"gout\" diet.     At this time it is felt Mr. Boggs has achieved maximum benefit from hospital therapy and is appropriate for discharge home today. Vital signs are stable and lab values have normalized. JAE today free of intracardiac thrombus, PFO, or ASD. He will be sent home on Xarelto and ASA per neurology and cardiology's recommendation with medication education provided per pharmacy. There is a follow-up appointment made with Dr. Hodge (cardiology) who will set up a 30-day cardiac monitor and further manage his vertebral disease. During his JAE he was found to have signs of CAMACHO and will need an out-patient sleep study arranged per his PCP. Wife agreed to monitor the patient's driving ability and if there are apparent visual deficits he is no longer to drive and recommended to pursue vision testing. Due to gout flair, out-patient rheumatology appointment was attempted, but requires his PCP to arrange the referral to which the patient and wife have been educated. The patient will be discharged home today with medications and follow-ups listed below.     Vitals:  /87   Pulse 85   Temp 97.4 °F (36.3 °C) (Axillary)   Resp 16   Ht 177.8 cm (70\")   Wt 101 kg (222 lb)   SpO2 96%   BMI 31.85 kg/m²     Physical Exam:  Telemetry:  Rhythm: normal sinus rhythm (11/12/19 0600)     Constitutional:  No acute distress.   Cardiovascular: RRR.   Normal heart sounds.  No murmurs, gallop or rub.   Respiratory: Normal breath sounds  No adventitious sounds.   Abdominal:  Soft with no tenderness.  No distension.   No HSM.   Extremities: Warm.  Dry.  No cyanosis.  No Edema   Neurological:             Alert, Oriented, Cooperative.  [place] Coma Scale Score: 15 (11/12/19 0600     Interval: (shift change )  1a. Level of Consciousness: 0-->Alert, keenly responsive  1b. LOC Questions: " 0-->Answers both questions correctly  1c. LOC Commands: 0-->Performs both tasks correctly  2. Best Gaze: 0-->Normal  3. Visual: 0-->No visual loss  4. Facial Palsy: 0-->Normal symmetrical movements  5a. Motor Arm, Left: 0-->No drift, limb holds 90 (or 45) degrees for full 10 secs  5b. Motor Arm, Right: 0-->No drift, limb holds 90 (or 45) degrees for full 10 secs  6a. Motor Leg, Left: 0-->No drift, leg holds 30 degree position for full 5 secs  6b. Motor Leg, Right: 0-->No drift, leg holds 30 degree position for full 5 secs  7. Limb Ataxia: 0-->Absent  8. Sensory: 0-->Normal, no sensory loss  9. Best Language: 0-->No aphasia, normal  10. Dysarthria: 1-->Mild-to-moderate dysarthria, patient slurs at least some words and, at worst, can be understood with some difficulty  11. Extinction and Inattention (formerly Neglect): 0-->No abnormality    Total (NIH Stroke Scale): 1    Labs:  Results from last 7 days   Lab Units 11/12/19  0431   WBC 10*3/mm3 9.52   HEMOGLOBIN g/dL 14.5   HEMATOCRIT % 44.0   PLATELETS 10*3/mm3 197     Results from last 7 days   Lab Units 11/12/19  0431 11/11/19  0630   SODIUM mmol/L 139 138   POTASSIUM mmol/L 4.1 3.6   CHLORIDE mmol/L 104 102   CO2 mmol/L 23.0 24.0   BUN mg/dL 11 9   CREATININE mg/dL 0.81 0.77   CALCIUM mg/dL 8.8 8.2*   BILIRUBIN mg/dL  --  0.9   ALK PHOS U/L  --  58   ALT (SGPT) U/L  --  11   AST (SGOT) U/L  --  21   GLUCOSE mg/dL 105* 101*         Magnesium   Date Value Ref Range Status   11/12/2019 2.2 1.6 - 2.4 mg/dL Final   11/11/2019 2.1 1.6 - 2.4 mg/dL Final     Phosphorus   Date Value Ref Range Status   11/12/2019 4.5 2.5 - 4.5 mg/dL Final                    Discharge Medications      New Medications      Instructions Start Date   allopurinol 100 MG tablet  Commonly known as:  ZYLOPRIM   100 mg, Oral, Daily   Start Date:  11/13/2019     aspirin 325 MG tablet   325 mg, Oral, Daily   Start Date:  11/13/2019     atorvastatin 80 MG tablet  Commonly known as:  LIPITOR   80 mg,  Oral, Nightly      carvedilol 6.25 MG tablet  Commonly known as:  COREG   6.25 mg, Oral, 2 Times Daily With Meals      lisinopril 5 MG tablet  Commonly known as:  PRINIVIL,ZESTRIL   5 mg, Oral, Every 24 Hours Scheduled      rivaroxaban 20 MG tablet  Commonly known as:  XARELTO   20 mg, Oral, Daily With Dinner         Changes to Medications      Instructions Start Date   colchicine 0.6 MG tablet  What changed:    · how much to take  · how to take this  · when to take this  · reasons to take this   0.6 mg, Oral, Every 12 Hours Scheduled         Continue These Medications      Instructions Start Date   albuterol sulfate  (90 Base) MCG/ACT inhaler  Commonly known as:  PROAIR HFA   2 puffs, Inhalation, Every 6 Hours PRN      azithromycin 250 MG tablet  Commonly known as:  ZITHROMAX   250 mg, Oral, 3 Times Weekly, Mon, Wed, Fri      fluticasone-salmeterol 500-50 MCG/DOSE DISKUS  Commonly known as:  ADVAIR DISKUS   1 puff, Inhalation, 2 Times Daily - RT      ibuprofen 600 MG tablet  Commonly known as:  ADVIL,MOTRIN   As Needed      ipratropium-albuterol 0.5-2.5 mg/3 ml nebulizer  Commonly known as:  DUO-NEB   3 mL, Nebulization, 4 Times Daily PRN      latanoprost 0.005 % ophthalmic solution  Commonly known as:  XALATAN   Daily PRN      montelukast 10 MG tablet  Commonly known as:  SINGULAIR   10 mg, Oral, Nightly             Diet Instructions     Diet: Regular, Consistent Carbohydrate; Thin      Discharge Diet:   Regular  Consistent Carbohydrate       Fluid Consistency:  Thin          Activity Instructions     Activity as Tolerated      Driving Restrictions      Wife to be with patient while driving and if witnessed visual deficit must undergo vision testing    Type of Restriction:  Driving    Driving Restrictions:  No Driving Restrictions          Follow-up Appointments  No future appointments.  Additional Instructions for the Follow-ups that You Need to Schedule     Discharge Follow-up with PCP   As directed        Currently Documented PCP:    Gillian Acuña APRN    PCP Phone Number:    [phone]     Follow Up Details:  At earliest convenience; arrange out-patient sleep study and referral to rheumatology         Discharge Follow-up with Specified Provider: Dr. Hodge; 1 Week   As directed      To:  Dr. Hodge    Follow Up:  1 Week    Follow Up Details:  To arrange 30day cardiac monitoring               Discharge Instructions:  1. Discharge home today   2. Medications as above   3. Follow-ups as above   1. PCP at earliest convenience   1. To arrange sleep study   2. To arrange rheumatology referral   2. Dr. Hodge in 1 week   1. Will be set up for 30 day cardiac monitoring   4. Wife to monitor patient's driving and undergo vision testing if deficit detected   5. If symptoms worsen or recur, seek medical attention or call 911      GIRISH Perry, AGACNP-BC, FNP-BC   Pulmonary and Critical Care     Time: Discharge 45 min    CC: Gillian Acuña APRN

## 2019-11-12 NOTE — PROGRESS NOTES
INTENSIVIST   PROGRESS NOTE     Hospital:  LOS: 2 days      S     Mr. Oswaldo Boggs, 74 y.o. male is followed for:      AIS s/p tPA    PAF (paroxysmal atrial fibrillation)     As an Intensivist, we provide an integrated approach to the ICU patient and family, medical management of comorbid conditions, lead interdisciplinary rounds and coordinate the care with all other services, including those from other specialists.     Interval History:  He had a 'gout attack', left foot, yesterday better after colchicine and NSAIDS.    He is feeling well this morning.    He uses O2 at nighttime.     The patient's relevant past medical, surgical and social history were reviewed and updated in Epic as appropriate.     ROS:   Constitutional: Negative for fever.   Respiratory: Negative for dyspnea. Chronic Cough.   Cardiovascular: Negative for chest pain.   Gastrointestinal: Negative for  nausea, vomiting and diarrhea.        O     Vitals:  Temp: 98 °F (36.7 °C) (11/12/19 0400) Temp  Min: 98 °F (36.7 °C)  Max: 98.3 °F (36.8 °C)   Temp core:      BP: 130/85 (11/12/19 0600) BP  Min: 118/80  Max: 153/90   Pulse: 87 (11/12/19 0727) Pulse  Min: 80  Max: 105   Resp: 18 (11/12/19 0600) Resp  Min: 16  Max: 18   SpO2: 97 % (11/12/19 0600) SpO2  Min: 92 %  Max: 100 %   Device: room air (11/12/19 1202)    Flow Rate: 2 (11/12/19 0600) Flow (L/min)  Min: 2  Max: 2     Intake/Ouptut 24 hrs (7:00AM - 6:59 AM)  Intake/Output       11/11/19 0700 - 11/12/19 0659    Intake (ml) 670.2    Output (ml) 1150    Net (ml) -479.8    Last Weight  101 kg (222 lb 10.6 oz)         Physical Examination  Telemetry:  Rhythm: normal sinus rhythm (11/12/19 0600)         Constitutional:  No acute distress.   Cardiovascular: RRR.   Normal heart sounds.  No murmurs, gallop or rub.   Respiratory: Normal breath sounds  No adventitious sounds.   Abdominal:  Soft with no tenderness.  No distension.   No HSM.   Extremities: Warm.  Dry.  No cyanosis.  No Edema    Neurological:   Alert, Oriented, Cooperative.  Best Eye Response: 4-->(E4) spontaneous (11/12/19 0600)  Best Motor Response: 6-->(M6) obeys commands (11/12/19 0600)  Best Verbal Response: 5-->(V5) oriented (11/12/19 0600)  [place] Coma Scale Score: 15 (11/12/19 0600)   Lines/Drains/Airways: Peripheral IV(s)     NIH Stroke Scale  Interval: (shift change ) (11/11/19 1900)  1a. Level of Consciousness: 0-->Alert, keenly responsive (11/12/19 0700)  1b. LOC Questions: 0-->Answers both questions correctly (11/12/19 0700)  1c. LOC Commands: 0-->Performs both tasks correctly (11/12/19 0700)  2. Best Gaze: 0-->Normal (11/12/19 0700)  3. Visual: 0-->No visual loss (11/12/19 0700)  4. Facial Palsy: 0-->Normal symmetrical movements (11/12/19 0700)  5a. Motor Arm, Left: 0-->No drift, limb holds 90 (or 45) degrees for full 10 secs (11/12/19 0700)  5b. Motor Arm, Right: 0-->No drift, limb holds 90 (or 45) degrees for full 10 secs (11/12/19 0700)  6a. Motor Leg, Left: 0-->No drift, leg holds 30 degree position for full 5 secs (11/12/19 0700)  6b. Motor Leg, Right: 0-->No drift, leg holds 30 degree position for full 5 secs (11/12/19 0700)  7. Limb Ataxia: 0-->Absent (11/12/19 0700)  8. Sensory: 0-->Normal, no sensory loss (11/12/19 0700)  9. Best Language: 0-->No aphasia, normal (11/12/19 0700)  10. Dysarthria: 1-->Mild-to-moderate dysarthria, patient slurs at least some words and, at worst, can be understood with some difficulty (11/12/19 0700)  11. Extinction and Inattention (formerly Neglect): 0-->No abnormality (11/12/19 0700)  Total (NIH Stroke Scale): 1 (11/12/19 0700)    Hematology:  Results from last 7 days   Lab Units 11/12/19 0431 11/11/19 0630   WBC 10*3/mm3 9.52 7.96   HEMOGLOBIN g/dL 14.5 14.3   MCV fL 97.6* 98.9*   PLATELETS 10*3/mm3 197 190       Chemistry:  Estimated Creatinine Clearance: 95.3 mL/min (by C-G formula based on SCr of 0.81 mg/dL).    Results from last 7 days   Lab Units 11/12/19 0431 11/11/19 0630    SODIUM mmol/L 139 138   POTASSIUM mmol/L 4.1 3.6   CHLORIDE mmol/L 104 102   CO2 mmol/L 23.0 24.0   ANION GAP mmol/L 12.0 12.0   GLUCOSE mg/dL 105* 101*   CALCIUM mg/dL 8.8 8.2*     Results from last 7 days   Lab Units 11/12/19  0431 11/11/19  0630   BUN mg/dL 11 9   CREATININE mg/dL 0.81 0.77     Results from last 7 days   Lab Units 11/12/19  0431 11/11/19  0630   MAGNESIUM mg/dL 2.2 2.1   PHOSPHORUS mg/dL 4.5  --        Hepatic:  Results from last 7 days   Lab Units 11/11/19  0630   ALK PHOS U/L 58   BILIRUBIN mg/dL 0.9   ALT (SGPT) U/L 11   AST (SGOT) U/L 21   ALBUMIN g/dL 3.40*     Images:  Ct Angiogram Head    Result Date: 11/10/2019  1. There is severe disease at the origin of the dominant left vertebral artery over a short segment. 2. The right vertebral artery is severely diseased and provides only minimal supply the basilar system. 3. By NASCET criteria 0% stenosis at either carotid bifurcation. There is bilateral partially calcified plaque #4 no central intracranial vascular cut off 5. Fetal origin to the left posterior cervical artery distribution. 6. Hypoplastic or diseased left A1 vessel is supplied of the left A2 vessel via the intercommunicating artery. Signer Name: Quyen Cortes MD  Signed: 11/10/2019 7:57 PM  Workstation Name: [email or web address]  [facility]    Ct Head Without Contrast    Result Date: 11/12/2019  1.  No acute intracranial hemorrhage identified. 2.  Redemonstration of hypoattenuated region within the either inferior right occipital region or possibly superior cerebellar region, slightly increased from 11/10/2019, which could relate to an evolving infarct. Consider follow-up MRI imaging of the brain if the patient is a candidate.  D:  11/11/2019 E:  11/12/2019        Mri Brain Without Contrast    Result Date: 11/10/2019  1. Areas of recent infarction involving the medial right parieto-occipital lobe at the calcarine cortex and right superior cerebellar hemisphere with  subtle local mass effect but nothing to suggest hemorrhagic transformation. Findings are most consistent with thromboembolic insults to the posterior circulation. See the separate, earlier CT angiogram neck vessels for description of posterior circulation disease. At this time there is no hydrocephalus. Follow-up imaging recommended. 2. Preexisting atrophy and mild probable sequelae of small vessel disease. 3. Partial redemonstration of paranasal sinus disease. NOTIFICATION: Critical Value/emergent results were called by telephone at the time of interpretation on 11/10/2019 10:54 PM to medical exchange. I have left my cell phone number for callback from Dr. Clement. I spoke to Dr. Clement at 10:57 PM Signer Name: Quyen Cortes MD  Signed: 11/10/2019 10:57 PM  Workstation Name: [facility]  [facility]    Ct Cerebral Perfusion With & Without Contrast    Result Date: 11/10/2019  1. On noncontrast head CT, there is no evidence for acute intracranial hemorrhage. There is a vague area of low attenuation at the medial aspect of the right parieto-occipital lobe which could be an area of ischemia but it does not correspond to a perfusion abnormality on the subsequent perfusion portion of the study. It is best further characterized with a MRI if the patient is a candidate. 2. No ischemic penumbra is documented on the perfusion imaging 3. Paranasal sinus disease including a sinus air-fluid level right maxillary sinus Signer Name: Quyen Cortes MD  Signed: 11/10/2019 7:42 PM  Workstation Name: [facility]  [facility]iology [facility]      Results: Reviewed.  I reviewed the patient's new laboratory and imaging results.  I independently reviewed the patient's new images.    Medications: Reviewed.    Assessment/Plan   A / P     Assessment:    74 y.o.male, admitted on 11/10/2019 with CVA (cerebral vascular accident) (CMS/[facility]) [I63.9]:     1. AIS s/p tPA ( Medial Right parieto occipital lobe at the  "calcarine cortex and right superio cerebellar hemisphere)   1. Embolic  2. Cardiac:  1. P A Fib  2. HTN  3. Dyslipidemia   3. Bronchiectasis, on Chronic Azythromycin.  4. Gout. He was only using Colchicine prn  5. EtOH use  6. At risk for DM (pre-diabetes) based on his HbA1c. [HbA1C 5.7%-6.4%, eA-139 mg/dL].     Results from last 7 days   Lab Units 19  0609 11/10/19  2334   GLUCOSE mg/dL 94 132*     Lab Results   Lab Value Date/Time    HGBA1C 5.80 (H) 2019 0630       Nutrition Support: Patient isn't on Tube Feeding   Modulars: Patient doesn't have any tube feeding modular orders   Diet: NPO Diet NPO Except: Sips With Meds   Advance Directives: Code Status and Medical Interventions:   Ordered at: 11/10/19 1833     Level Of Support Discussed With:    Patient     Code Status:    CPR     Medical Interventions (Level of Support Prior to Arrest):    Full        Plan:    1. Folic acid and B1 supplements.  2. Schedule colchicine.  3. Start allopurinol tomorrow.  4. Dietitian to instruct in a \"gout\" diet.  5. JAE today  6. Disposition: Transfer to Telemetry Unit./Home } when OK with consultants.    Plan of care and goals reviewed during interdisciplinary rounds.  I discussed the patient's findings and my recommendations with patient and nursing staff    Level of Risk is High due to:  illness with threat to life or bodily function.     Time: 25 minutes, in direct patient care, with the family and/or on the comer coordinating care with other health care providers.     I have spent > 50% percent of this time, counseling and discussing management.       Mejia Jacobs MD, FACP, FCCP, CNSC  Intensive Care Medicine, Nutrition Support and Pulmonary Medicine     [x]  Primary Attending  []  Consultant    "

## 2019-11-13 ENCOUNTER — READMISSION MANAGEMENT (OUTPATIENT)
Dept: CALL CENTER | Facility: HOSPITAL | Age: 74
End: 2019-11-13

## 2019-11-14 NOTE — OUTREACH NOTE
Prep Survey      Responses   Facility patient discharged from?  [place]   Is patient eligible?  Yes   Discharge diagnosis  Stroke s/p tPA   Does the patient have one of the following disease processes/diagnoses(primary or secondary)?  Stroke (TIA)   Does the patient have Home health ordered?  No   Is there a DME ordered?  No   Prep survey completed?  Yes          Sury Briggs RN

## 2019-11-16 ENCOUNTER — READMISSION MANAGEMENT (OUTPATIENT)
Dept: CALL CENTER | Facility: HOSPITAL | Age: 74
End: 2019-11-16

## 2019-11-16 NOTE — OUTREACH NOTE
Stroke Week 1 Survey      Responses   Facility patient discharged from?  [place]   Does the patient have one of the following disease processes/diagnoses(primary or secondary)?  Stroke (TIA)   Is there a successful TCM telephone encounter documented?  No   Week 1 attempt successful?  Yes   Call start time  1108   Call end time  1110   Discharge diagnosis  Stroke s/p tPA   Meds reviewed with patient/caregiver?  Yes   Is the patient having any side effects they believe may be caused by any medication additions or changes?  No   Does the patient have all medications ordered at discharge?  Yes   Is the patient taking all medications as directed (includes completed medication regime)?  Yes   Does the patient have a primary care provider?   Yes   Does the patient have an appointment with their PCP within 7 days of discharge?  Yes   Has the patient kept scheduled appointments due by today?  Yes   Has home health visited the patient within 72 hours of discharge?  N/A   Psychosocial issues?  No   Does the patient require any assistance with activities of daily living such as eating, bathing, dressing, walking, etc.?  No   Does the patient have any residual symptoms from stroke/TIA?  Yes   Does the patient understand the diet ordered at discharge?  Yes   Did the patient receive a copy of their discharge instructions?  Yes   Nursing interventions  Reviewed instructions with patient   What is the patient's perception of their health status since discharge?  Improving   Nursing interventions  Nurse provided patient education   Is the patient able to teach back FAST for Stroke?  Yes   Is the patient/caregiver able to teach back the risk factors for a stroke?  High blood pressure-goal below 120/80, High Cholesterol   Is the patient/caregiver able to teach back signs and symptoms related to disease process for when to call PCP?  Yes   Is the patient/caregiver able to teach back signs and symptoms related to disease process for  when to call 911?  Yes   Is the patient/caregiver able to teach back the hierarchy of who to call/visit for symptoms/problems? PCP, Specialist, Home health nurse, Urgent Care, ED, 911  Yes   Week 1 call completed?  Yes   Revoked  No further contact(revokes)-requires comment   Graduated/Revoked comments  Reports he is well educated. Refused to participate any further.          Jacky Choi RN

## 2021-11-22 ENCOUNTER — OFFICE VISIT (OUTPATIENT)
Dept: PULMONOLOGY | Facility: CLINIC | Age: 76
End: 2021-11-22

## 2021-11-22 DIAGNOSIS — J41.0 SIMPLE CHRONIC BRONCHITIS (HCC): Primary | ICD-10-CM

## 2021-11-22 DIAGNOSIS — Z00.6 EXAMINATION FOR NORMAL COMPARISON OR CONTROL IN CLINICAL RESEARCH: Primary | ICD-10-CM

## 2021-11-22 RX ORDER — METOPROLOL TARTRATE 50 MG/1
TABLET, FILM COATED ORAL
COMMUNITY
Start: 2021-10-25

## 2021-11-22 RX ORDER — GABAPENTIN 300 MG/1
CAPSULE ORAL
COMMUNITY
Start: 2021-11-03

## 2023-09-07 ENCOUNTER — OFFICE VISIT (OUTPATIENT)
Dept: PULMONOLOGY | Facility: CLINIC | Age: 78
End: 2023-09-07
Payer: MEDICARE

## 2023-09-07 VITALS
TEMPERATURE: 98.3 F | HEART RATE: 64 BPM | SYSTOLIC BLOOD PRESSURE: 128 MMHG | HEIGHT: 70 IN | BODY MASS INDEX: 34.89 KG/M2 | OXYGEN SATURATION: 94 % | DIASTOLIC BLOOD PRESSURE: 80 MMHG | WEIGHT: 243.7 LBS

## 2023-09-07 DIAGNOSIS — I48.0 PAF (PAROXYSMAL ATRIAL FIBRILLATION): ICD-10-CM

## 2023-09-07 DIAGNOSIS — R05.3 CHRONIC COUGH: ICD-10-CM

## 2023-09-07 DIAGNOSIS — G47.34 NOCTURNAL HYPOXEMIA: ICD-10-CM

## 2023-09-07 DIAGNOSIS — E66.9 OBESITY, CLASS II, BMI 35-39.9: ICD-10-CM

## 2023-09-07 DIAGNOSIS — J47.9 BRONCHIECTASIS WITHOUT COMPLICATION: Primary | ICD-10-CM

## 2023-09-07 PROBLEM — E66.812 OBESITY, CLASS II, BMI 35-39.9: Status: ACTIVE | Noted: 2023-09-07

## 2023-09-07 RX ORDER — ALBUTEROL SULFATE 90 UG/1
4 AEROSOL, METERED RESPIRATORY (INHALATION) ONCE
Status: COMPLETED | OUTPATIENT
Start: 2023-09-07 | End: 2023-09-07

## 2023-09-07 RX ADMIN — ALBUTEROL SULFATE 4 PUFF: 90 AEROSOL, METERED RESPIRATORY (INHALATION) at 13:16

## 2023-09-07 NOTE — PROGRESS NOTES
PULMONARY  NOTE    Chief Complaint     Bronchiectasis, chronic cough, history of PAF, nocturnal hypoxemia    History of Present Illness     77-year-old male returns today for reevaluation  Previously seen in the office by Dr. Myrick and before that Dr. Latif although last seen quite a few years ago  The last few years he has been followed in [place]    He has idiopathic bronchiectasis  He has chronic cough and sputum production  He also has dyspnea on exertion    He uses Trelegy with albuterol as needed  He has an albuterol nebulizer but tries to avoid it because it aggravates tachycardia    He has a regular cough  No hemoptysis    He has been on azithromycin on a Monday, Wednesday, and Friday schedule  Uses azithromycin 250 mg  No recent courses of steroids    He does not have regular reflux symptoms and does not take a regular acid reducing medication    He has nocturnal hypoxemia and uses supplemental oxygen on a nightly basis    He is noted lower extremity edema    Patient Active Problem List   Diagnosis    Bronchiectasis    Dyslipidemia    PPD positive    AIS s/p tPA    PAF (paroxysmal atrial fibrillation)    Hypertension    Alcohol abuse    Obesity, Class II, BMI 35-39.9    Nocturnal hypoxemia    Chronic cough      Allergies   Allergen Reactions    Levofloxacin Itching    Amoxil [Amoxicillin] Rash       Current Outpatient Medications:     azithromycin (ZITHROMAX) 250 MG tablet, Take 1 tablet by mouth 3 (Three) Times a Week. Mon, Wed, Fri, Disp: 36 tablet, Rfl: 4    colchicine 0.6 MG tablet, Take 1 tablet by mouth Every 12 (Twelve) Hours., Disp: 60 tablet, Rfl: 1    fluticasone-salmeterol (ADVAIR DISKUS) 500-50 MCG/DOSE DISKUS, Inhale 1 puff 2 (Two) Times a Day., Disp: 3 each, Rfl: 4    gabapentin (NEURONTIN) 300 MG capsule, , Disp: , Rfl:     metoprolol tartrate (LOPRESSOR) 50 MG tablet, , Disp: , Rfl:     albuterol (PROAIR HFA) 108 (90 Base) MCG/ACT inhaler, Inhale 2 puffs Every 6 (Six) Hours As  "Needed for Wheezing., Disp: 1 inhaler, Rfl: 5    allopurinol (ZYLOPRIM) 100 MG tablet, Take 1 tablet by mouth Daily., Disp: 30 tablet, Rfl: 1    aspirin 325 MG tablet, Take 1 tablet by mouth Daily., Disp: 90 tablet, Rfl: 3    atorvastatin (LIPITOR) 80 MG tablet, Take 1 tablet by mouth Every Night., Disp: 30 tablet, Rfl: 5    ibuprofen (ADVIL,MOTRIN) 600 MG tablet, As Needed., Disp: , Rfl:     ipratropium-albuterol (DUO-NEB) 0.5-2.5 mg/3 ml nebulizer, Take 3 mL by nebulization 4 (Four) Times a Day As Needed for Wheezing., Disp: 270 mL, Rfl: 4    latanoprost (XALATAN) 0.005 % ophthalmic solution, Daily As Needed., Disp: , Rfl:     lisinopril (PRINIVIL,ZESTRIL) 5 MG tablet, Take 1 tablet by mouth Daily., Disp: 30 tablet, Rfl: 5    montelukast (SINGULAIR) 10 MG tablet, Take 1 tablet by mouth Every Night., Disp: 90 tablet, Rfl: 4    rivaroxaban (XARELTO) 20 MG tablet, Take 1 tablet by mouth Daily With Dinner., Disp: 30 tablet, Rfl: 5  No current facility-administered medications for this visit.  MEDICATION LIST AND ALLERGIES REVIEWED.    Family History   Problem Relation Age of Onset    COPD Mother     Alcohol abuse Father     Lung disease Sister      Social History     Tobacco Use    Smoking status: Never    Smokeless tobacco: Never   Substance Use Topics    Alcohol use: Yes     Comment: 2 or 3 shots of liquor a day    Drug use: No     Social History     Social History Narrative    Retired  heavy equipment, sold cars     FAMILY AND SOCIAL HISTORY REVIEWED.    Review of Systems  IF PRESENT REFER TO SCANNED ROS SHEET FROM SAME DATE  OTHERWISE ROS OBTAINED AND NON-CONTRIBUTORY OVER HPI.    /80 (BP Location: Left arm, Patient Position: Sitting, Cuff Size: Adult)   Pulse 64   Temp 98.3 °F (36.8 °C) (Infrared)   Ht 176.5 cm (69.5\")   Wt 111 kg (243 lb 11.2 oz)   SpO2 94% Comment: resting room air  BMI 35.47 kg/m²   Physical Exam  Vitals and nursing note reviewed.   Constitutional:       General: He is not " in acute distress.     Appearance: He is well-developed. He is not diaphoretic.   HENT:      Head: Normocephalic and atraumatic.   Neck:      Thyroid: No thyromegaly.   Cardiovascular:      Rate and Rhythm: Normal rate and regular rhythm.      Heart sounds: Normal heart sounds. No murmur heard.  Pulmonary:      Effort: Pulmonary effort is normal.      Breath sounds: No stridor.      Comments: Rhonchi in the bases, right greater than left that clear partially but not completely with cough  Abdominal:      General: Bowel sounds are normal.   Musculoskeletal:      Comments: Trace pitting lower extremity edema   Lymphadenopathy:      Cervical: No cervical adenopathy.      Upper Body:      Right upper body: No supraclavicular or epitrochlear adenopathy.      Left upper body: No supraclavicular or epitrochlear adenopathy.   Skin:     General: Skin is warm and dry.   Neurological:      Mental Status: He is alert and oriented to person, place, and time.   Psychiatric:         Behavior: Behavior normal.       Results     Chest x-ray reveals cardiomegaly and interstitial and cystic changes in the right middle and right lower lobe distribution that appears stable in comparison to prior chest x-ray    PFTs reveal moderate airway obstruction, no significant change in FEV1 after bronchodilator  No restriction and normal diffusion capacity  FVC and FEV1 are roughly stable in comparison to prior PFTs done at [facility] in 2021    Immunization History   Administered Date(s) Administered    COVID-19 (MODERNA) 1st,2nd,3rd Dose Monovalent 02/05/2021, 03/04/2021    COVID-19 (PFIZER) Purple Cap Monovalent 09/23/2022    Fluzone High Dose =>65 Years ([facility] ONLY) 10/29/2017, 09/27/2021, 09/23/2022    Influenza Quad Vaccine (Inpatient) 10/03/2017, 11/04/2019, 09/15/2020    PEDS-Pneumococcal Conjugate (PCV7) 10/03/2017    Pneumococcal Polysaccharide (PPSV23) 10/29/2017, 11/04/2019    Td (TDVAX) 09/18/2017    Zostavax  08/01/2018, 03/26/2021, 05/21/2021     Problem List       ICD-10-CM ICD-9-CM   1. Bronchiectasis without complication  J47.9 494.0   2. PAF (paroxysmal atrial fibrillation)  I48.0 427.31   3. Chronic cough  R05.3 786.2   4. Nocturnal hypoxemia  G47.34 327.24   5. Obesity, Class II, BMI 35-39.9  E66.9 278.00       Discussion     We reviewed his chest imaging and PFTs  The tests are stable/unchanged    I recommended reflux precautions, strict n.p.o. 2 hours before going to bed at night and elevation of the head of bed at night    I would like to check 2 nights of nocturnal pulse oximetry with him on his supplemental oxygen to make sure he is getting adequate oxygen supplementation at night    I recommended continued compliance with the exercises that were prescribed through pulmonary rehabilitation    I am going to prescribe hypertonic saline to be used in his nebulizer once a day  I have asked him to use his flutter valve on a regular basis, as well  In the future we might consider a percussion vest    I have asked him to remain on azithromycin on a Monday, Wednesday, and Friday schedule  We might increase that to 500 mg dose on his next visit    I will plan to see him back in 1-2 months for follow-up  Consider a CT scan of the chest and follow-up to reassess the degree and distribution of his underlying bronchiectasis    Moderate level of Medical Decision Making complexity based on 1 undiagnosed new problem or 2 stable chronic conditions, independent interpretation of tests, and/or prescription drug management     Vasu Reyna MD  Note electronically signed    CC: Gillian Acuña APRN

## 2023-09-11 RX ORDER — SODIUM CHLORIDE FOR INHALATION 3 %
4 VIAL, NEBULIZER (ML) INHALATION AS NEEDED
Qty: 240 ML | Refills: 3 | Status: SHIPPED | OUTPATIENT
Start: 2023-09-11

## 2023-09-11 NOTE — TELEPHONE ENCOUNTER
Pt called today multiple times requesting for medication refill. Spoke with pt asking which medications needing to be refilled and he could not determine which medications but suppose to be a total of 4 medications that Dr Reyna was suppose to be sent to [facility]. After naming some neb solution and inhalers that pt is currently taking only medication that was needed is Saline Neb Sol. Filled Saline Neb Sol per chart via fax sent to [facility] as requested. Pt verbalized appreciation.

## 2023-10-20 ENCOUNTER — TELEPHONE (OUTPATIENT)
Dept: PULMONOLOGY | Facility: CLINIC | Age: 78
End: 2023-10-20
Payer: MEDICARE

## 2025-02-17 ENCOUNTER — OFFICE VISIT (OUTPATIENT)
Age: 80
End: 2025-02-17
Payer: MEDICARE

## 2025-02-17 VITALS
OXYGEN SATURATION: 95 % | BODY MASS INDEX: 35.6 KG/M2 | SYSTOLIC BLOOD PRESSURE: 110 MMHG | DIASTOLIC BLOOD PRESSURE: 78 MMHG | HEART RATE: 112 BPM | HEIGHT: 69 IN | WEIGHT: 240.4 LBS | TEMPERATURE: 98.2 F

## 2025-02-17 DIAGNOSIS — R05.3 CHRONIC COUGH: ICD-10-CM

## 2025-02-17 DIAGNOSIS — J47.9 BRONCHIECTASIS WITHOUT COMPLICATION: ICD-10-CM

## 2025-02-17 DIAGNOSIS — I48.0 PAF (PAROXYSMAL ATRIAL FIBRILLATION): ICD-10-CM

## 2025-02-17 DIAGNOSIS — G47.34 NOCTURNAL HYPOXEMIA: ICD-10-CM

## 2025-02-17 DIAGNOSIS — J18.9 RECURRENT PNEUMONIA: Primary | ICD-10-CM

## 2025-02-17 PROBLEM — T78.40XA ALLERGIES: Status: ACTIVE | Noted: 2023-10-09

## 2025-02-17 RX ORDER — ALBUTEROL SULFATE 90 UG/1
4 INHALANT RESPIRATORY (INHALATION) ONCE
Status: COMPLETED | OUTPATIENT
Start: 2025-02-17 | End: 2025-02-17

## 2025-02-17 RX ORDER — DULOXETIN HYDROCHLORIDE 30 MG/1
CAPSULE, DELAYED RELEASE ORAL
COMMUNITY
Start: 2024-12-04 | End: 2025-02-17

## 2025-02-17 RX ORDER — NYSTATIN 100000 U/G
CREAM TOPICAL
COMMUNITY

## 2025-02-17 RX ORDER — DOXYCYCLINE HYCLATE 100 MG
TABLET ORAL
Qty: 42 TABLET | Refills: 3 | Status: SHIPPED | OUTPATIENT
Start: 2025-02-17

## 2025-02-17 RX ORDER — ALBUTEROL SULFATE 90 UG/1
2 INHALANT RESPIRATORY (INHALATION) EVERY 6 HOURS PRN
Qty: 8 G | Refills: 3 | Status: SHIPPED | OUTPATIENT
Start: 2025-02-17

## 2025-02-17 RX ORDER — NITROGLYCERIN 0.4 MG/1
TABLET SUBLINGUAL
COMMUNITY

## 2025-02-17 RX ORDER — PROMETHAZINE HYDROCHLORIDE 25 MG/1
TABLET ORAL
COMMUNITY
End: 2025-02-17

## 2025-02-17 RX ORDER — DIGOXIN 125 MCG
TABLET ORAL
COMMUNITY

## 2025-02-17 RX ORDER — KETOCONAZOLE 20 MG/ML
SHAMPOO, SUSPENSION TOPICAL
COMMUNITY
End: 2025-02-17

## 2025-02-17 RX ORDER — ROFLUMILAST 500 UG/1
500 TABLET ORAL DAILY
Qty: 90 TABLET | Refills: 3 | Status: SHIPPED | OUTPATIENT
Start: 2025-02-17

## 2025-02-17 RX ADMIN — ALBUTEROL SULFATE 4 PUFF: 90 INHALANT RESPIRATORY (INHALATION) at 14:30

## 2025-02-18 ENCOUNTER — TELEPHONE (OUTPATIENT)
Age: 80
End: 2025-02-18
Payer: MEDICARE

## 2025-02-18 NOTE — PROGRESS NOTES
[facility] Pulmonary Follow up    CHIEF COMPLAINT    congestion    HISTORY OF PRESENT ILLNESS    Oswaldo Boggs is a 79 y.o.male here today for follow-up.  He was last seen in the office by Dr. Vasu Reyna in September 2023.  He has been followed in our office since 2013 and saw Dr. Vasu Latif.  He has been treated for chronic asthma and bronchiectasis.  He was recommended to start percussion vest.  He also had a sputum culture that grew out Pseudomonas.  He was treated with JEFFREY nebs for 2 weeks and started on azithromycin every Monday, Wednesday and Friday.  He had been doing well with the Monday Wednesday Friday azithromycin but unfortunately developed a rash and has been off of this for a while.    He states he has more congestion since being off the azithromycin.  He recently finished an antibiotic about a week ago for bronchitis.    He will continue to cough up some thick gray secretions.  He denies any hemoptysis.  He denies any fever, chills or night sweats.    He did not feel like the percussion vest worked well.  He also has a flutter valve that he does try to use regularly.  He tried Mucinex in the past but did not feel like it helped.    He denies any chest pain or palpitations.   denies any lower extremity edema or calf tenderness.    He continues to use the Trelegy inhaler daily.  He also has nebulizers to use as needed.  He also has saline nebulizers but does not use these regularly.    He denies any reflux symptoms.      He is a lifetime non-smoker.    He is accompanied today by his wife.  Patient Active Problem List   Diagnosis    Bronchiectasis    Dyslipidemia    PPD positive    AIS s/p tPA    PAF (paroxysmal atrial fibrillation)    Hypertension    Alcohol abuse    Obesity, Class II, BMI 35-39.9    Nocturnal hypoxemia    Chronic cough    Allergies       Allergies   Allergen Reactions    Levofloxacin Itching    Amoxil [Amoxicillin] Rash    Azithromycin Itching       Current Outpatient  Medications:     albuterol sulfate HFA (ProAir HFA) 108 (90 Base) MCG/ACT inhaler, Inhale 2 puffs Every 6 (Six) Hours As Needed for Wheezing., Disp: 8 g, Rfl: 3    allopurinol (ZYLOPRIM) 100 MG tablet, Take 1 tablet by mouth Daily., Disp: 30 tablet, Rfl: 1    colchicine 0.6 MG tablet, Take 1 tablet by mouth Every 12 (Twelve) Hours., Disp: 60 tablet, Rfl: 1    digoxin (LANOXIN) 125 MCG tablet, , Disp: , Rfl:     gabapentin (NEURONTIN) 300 MG capsule, , Disp: , Rfl:     ipratropium-albuterol (DUO-NEB) 0.5-2.5 mg/3 ml nebulizer, Take 3 mL by nebulization 4 (Four) Times a Day As Needed for Wheezing., Disp: 270 mL, Rfl: 4    latanoprost (XALATAN) 0.005 % ophthalmic solution, Daily As Needed., Disp: , Rfl:     Metoprolol Succinate 50 MG capsule extended-release 24 hour sprinkle, Take 1 capsule by mouth Daily., Disp: , Rfl:     montelukast (SINGULAIR) 10 MG tablet, Take 1 tablet by mouth Every Night., Disp: 90 tablet, Rfl: 4    nitroglycerin (NITROSTAT) 0.4 MG SL tablet, , Disp: , Rfl:     nystatin (MYCOSTATIN) 956223 UNIT/GM cream, Apply to feet twice daily for 7 consecutive days per episode of athletes foot, Disp: , Rfl:     rivaroxaban (XARELTO) 20 MG tablet, Take 1 tablet by mouth Daily With Dinner., Disp: 30 tablet, Rfl: 5    sodium chloride 3 % nebulizer solution, Take 4 mL by nebulization As Needed for Cough., Disp: 240 mL, Rfl: 3    amoxicillin-clavulanate (AUGMENTIN) 875-125 MG per tablet, Take the first week of even months for 7 days., Disp: 42 tablet, Rfl: 3    doxycycline (VIBRAMYICN) 100 MG tablet, Take the 1st week for 7 days of the odd months., Disp: 42 tablet, Rfl: 3    Fluticasone-Umeclidin-Vilant (TRELEGY) 100-62.5-25 MCG/ACT inhaler, Inhale 1 puff Daily., Disp: 180 each, Rfl: 3    roflumilast (DALIRESP) 500 MCG tablet tablet, Take 1 tablet by mouth Daily., Disp: 90 tablet, Rfl: 3  No current facility-administered medications for this visit.  MEDICATION LIST AND ALLERGIES REVIEWED.    Social History  "    Tobacco Use    Smoking status: Never    Smokeless tobacco: Never   Vaping Use    Vaping status: Never Used   Substance Use Topics    Alcohol use: Yes     Comment: 2 or 3 shots of liquor a day    Drug use: No       FAMILY AND SOCIAL HISTORY REVIEWED.    Review of Systems   Constitutional:  Negative for activity change, appetite change, fatigue, fever and unexpected weight change.   HENT:  Positive for congestion. Negative for postnasal drip, rhinorrhea, sinus pressure, sore throat and voice change.    Eyes:  Negative for visual disturbance.   Respiratory:  Positive for cough and shortness of breath. Negative for chest tightness and wheezing.    Cardiovascular:  Negative for chest pain, palpitations and leg swelling.   Gastrointestinal:  Negative for abdominal distention, abdominal pain, nausea and vomiting.   Endocrine: Negative for cold intolerance and heat intolerance.   Genitourinary:  Negative for difficulty urinating and urgency.   Musculoskeletal:  Negative for arthralgias, back pain and neck pain.   Skin:  Negative for color change and pallor.   Allergic/Immunologic: Negative for environmental allergies and food allergies.   Neurological:  Negative for dizziness, syncope, weakness and light-headedness.   Hematological:  Negative for adenopathy. Does not bruise/bleed easily.   Psychiatric/Behavioral:  Negative for agitation and behavioral problems.    .    /78   Pulse 112   Temp 98.2 °F (36.8 °C)   Ht 175.3 cm (69\")   Wt 109 kg (240 lb 6.4 oz)   SpO2 95%   BMI 35.50 kg/m²     Immunization History   Administered Date(s) Administered    COVID-19 (MODERNA) 1st,2nd,3rd Dose Monovalent 02/05/2021, 03/04/2021    COVID-19 (PFIZER) Purple Cap Monovalent 09/23/2022    Fluzone  >6mos 10/03/2017, 11/04/2019, 09/15/2020    Fluzone (or Fluarix & Flulaval for VFC) >6mos 10/01/2024    Fluzone High-Dose 65+YRS 10/29/2017, 09/27/2021, 09/23/2022    PEDS-Pneumococcal Conjugate (PCV7) 10/03/2017    Pneumococcal " Polysaccharide (PPSV23) 10/29/2017, 11/04/2019    Td (TDVAX) 09/18/2017    Zostavax 08/01/2018, 03/26/2021, 05/21/2021       Physical Exam  Vitals and nursing note reviewed.   Constitutional:       Appearance: He is well-developed. He is not diaphoretic.   HENT:      Head: Normocephalic and atraumatic.   Eyes:      Pupils: Pupils are equal, round, and reactive to light.   Neck:      Thyroid: No thyromegaly.   Cardiovascular:      Rate and Rhythm: Normal rate and regular rhythm.      Heart sounds: Normal heart sounds. No murmur heard.     No friction rub. No gallop.   Pulmonary:      Effort: Pulmonary effort is normal. No respiratory distress.      Breath sounds: Normal breath sounds. No wheezing or rales.   Chest:      Chest wall: No tenderness.   Abdominal:      General: Bowel sounds are normal.      Palpations: Abdomen is soft.      Tenderness: There is no abdominal tenderness.   Musculoskeletal:         General: No swelling. Normal range of motion.      Cervical back: Normal range of motion and neck supple.   Lymphadenopathy:      Cervical: No cervical adenopathy.   Skin:     General: Skin is warm and dry.      Capillary Refill: Capillary refill takes less than 2 seconds.   Neurological:      Mental Status: He is alert and oriented to person, place, and time.   Psychiatric:         Mood and Affect: Mood normal.         Behavior: Behavior normal.           RESULTS    Spirometry Interpretation: FVC 2.59 67% predicted, FEV1 1.35 47% predicted, FEV1/FVC 52% predicted, TLC 6.51 94% predicted, DLCO 71% predicted, moderate obstruction with no postbronchodilator response, no restriction and reduced DLCO    Chest PA/lateral: Official report pending    PROBLEM LIST    Problem List Items Addressed This Visit          Cardiac and Vasculature    PAF (paroxysmal atrial fibrillation)    Overview     Followed by Dr. Hodge. Started on Diltiazem.         Relevant Medications    digoxin (LANOXIN) 125 MCG tablet    Metoprolol  Succinate 50 MG capsule extended-release 24 hour sprinkle    nitroglycerin (NITROSTAT) 0.4 MG SL tablet       Pulmonary and Pneumonias    Bronchiectasis    Overview      A.  Saccular bronchiectasis of the right middle lobe and lingula and mild cylindrical      bronchiectasis of the left lower lobe noted on CT scan of the chest 01/28/2013, this is      unchanged on CT scan of 08/25/2014.B.  Pseudomonas aeruginosa in his sputum,      09/26/2013. Sensitive to Levaquin, tobramycin, meropenem, aztreonam, ceftazidime and      Zosyn   B.  Chest x-ray 02/08/2016: Heart size is normal as is the bony thorax and soft tissues.  There are increased markings in the bases but no true consolidative infiltrate.  Most findings are in the area of the lingula and the left lower lobe.  This film is compared to the previous study of 01/28/2013. The older film had a fairly significant infiltrate in the right lower lobe.  That is now completely cleared although the left lower lobe abnormalities and lingular abnormalities persist and are most likely related to his bronchiectasis.          Relevant Medications    doxycycline (VIBRAMYICN) 100 MG tablet    amoxicillin-clavulanate (AUGMENTIN) 875-125 MG per tablet    roflumilast (DALIRESP) 500 MCG tablet tablet    albuterol sulfate HFA (ProAir HFA) 108 (90 Base) MCG/ACT inhaler    Fluticasone-Umeclidin-Vilant (TRELEGY) 100-62.5-25 MCG/ACT inhaler    albuterol sulfate HFA (PROVENTIL HFA;VENTOLIN HFA;PROAIR HFA) inhaler 4 puff (Completed)    Other Relevant Orders    Complete PFT - Pre & Post Bronchodilator (Completed)    Chronic cough       Sleep    Nocturnal hypoxemia     Other Visit Diagnoses       Recurrent pneumonia    -  Primary    Relevant Medications    doxycycline (VIBRAMYICN) 100 MG tablet    amoxicillin-clavulanate (AUGMENTIN) 875-125 MG per tablet    roflumilast (DALIRESP) 500 MCG tablet tablet    albuterol sulfate HFA (ProAir HFA) 108 (90 Base) MCG/ACT inhaler     Fluticasone-Umeclidin-Vilant (TRELEGY) 100-62.5-25 MCG/ACT inhaler    albuterol sulfate HFA (PROVENTIL HFA;VENTOLIN HFA;PROAIR HFA) inhaler 4 puff (Completed)    Other Relevant Orders    XR Chest PA & Lateral              DISCUSSION    Mr. Paredes was here for follow-up.  He seems be doing okay from a pulmonary standpoint.  We did review his PFTs in the office today and he continues to have a moderate obstruction.  He will continue the Trelegy daily.  I did send in refills.    We did review his chest x-ray in the office today and official report is pending.  I will notify him of any abnormalities.    We will obtain sputum cultures at his next appointment.  He has been a while since he has had any sputum cultures performed.  He does have a history of Pseudomonas.    He cannot tolerate azithromycin any longer due to an allergy.  He has had multiple rounds of antibiotics in the past.  We are going to try rotating antibiotics for him for his bronchiectasis.  He states that he is not allergic to amoxicillin.  Will try Augmentin and doxycycline rotating monthly.  Hopefully this will prevent  future exacerbations.    I encouraged him to continue using his flutter valve daily and try to get saline nebulizers performed at least daily.    He will continue oxygen at nighttime.    He will continue close follow-up with cardiology for his A-fib.    We will have him follow-up in 6 months.  We will obtain sputum cultures and a 6-minute walk test at his next appointment.    I personally spent a total of 33 minutes on patient visit today including chart review, face to face with the patient obtaining the history and physical exam, review of pertinent images and tests, counseling and discussion and/or coordination of care as described above, and documentation.  Total time excludes time spent on other separate services such as performing procedures or test interpretation, if applicable.        Gaviota Archer, APRN  02/17/202507:52  EST  Electronically signed     Please note that portions of this note were completed with a voice recognition program.        CC: Gillian Acuña APRN

## 2025-02-19 DIAGNOSIS — J47.9 BRONCHIECTASIS WITHOUT COMPLICATION: ICD-10-CM

## 2025-02-19 NOTE — TELEPHONE ENCOUNTER
Pt called requesting Trelegy be sent to [facility]. This has been sent and Sylvie was notified to D/C it.

## 2025-02-25 ENCOUNTER — TELEPHONE (OUTPATIENT)
Age: 80
End: 2025-02-25
Payer: MEDICARE

## 2025-02-25 NOTE — TELEPHONE ENCOUNTER
[facility] called, they need office notes from appointment on 2-7-25  [phone] bob Keys

## 2025-04-23 ENCOUNTER — TELEPHONE (OUTPATIENT)
Age: 80
End: 2025-04-23
Payer: MEDICARE